# Patient Record
Sex: MALE | Race: BLACK OR AFRICAN AMERICAN | NOT HISPANIC OR LATINO | Employment: OTHER | URBAN - METROPOLITAN AREA
[De-identification: names, ages, dates, MRNs, and addresses within clinical notes are randomized per-mention and may not be internally consistent; named-entity substitution may affect disease eponyms.]

---

## 2020-04-28 ENCOUNTER — HOSPITAL ENCOUNTER (OUTPATIENT)
Dept: ULTRASOUND IMAGING | Age: 70
Discharge: HOME | End: 2020-04-28
Attending: INTERNAL MEDICINE

## 2020-04-28 DIAGNOSIS — M79.604 RIGHT LEG PAIN: ICD-10-CM

## 2020-04-28 PROCEDURE — 93971 EXTREMITY STUDY: CPT

## 2020-04-28 PROCEDURE — 93926 LOWER EXTREMITY STUDY: CPT

## 2020-06-18 ENCOUNTER — OFFICE VISIT (OUTPATIENT)
Dept: WOUND CARE | Facility: HOSPITAL | Age: 70
End: 2020-06-18
Attending: FAMILY MEDICINE
Payer: MEDICARE

## 2020-06-18 DIAGNOSIS — L97.512 NON-PRESSURE CHRONIC ULCER OF OTHER PART OF RIGHT FOOT WITH FAT LAYER EXPOSED (HCC): ICD-10-CM

## 2020-06-18 DIAGNOSIS — E08.621 DIABETIC ULCER OF MIDFOOT ASSOCIATED WITH DIABETES MELLITUS DUE TO UNDERLYING CONDITION, WITH FAT LAYER EXPOSED, UNSPECIFIED LATERALITY (HCC): Primary | ICD-10-CM

## 2020-06-18 DIAGNOSIS — L97.402 DIABETIC ULCER OF MIDFOOT ASSOCIATED WITH DIABETES MELLITUS DUE TO UNDERLYING CONDITION, WITH FAT LAYER EXPOSED, UNSPECIFIED LATERALITY (HCC): Primary | ICD-10-CM

## 2020-06-18 PROCEDURE — 99215 OFFICE O/P EST HI 40 MIN: CPT

## 2020-06-18 PROCEDURE — 82962 GLUCOSE BLOOD TEST: CPT

## 2020-06-18 NOTE — PROGRESS NOTES
Print   x Close    Header Image    Progress Note Details  Patient Name: Allie Montes.   Patient Number: [id number]  Patient YOB: 1950  Date: 6/18/2020  Physician / Xenia Class: Lester Hood: Handy Owusu    Chief Complaint  T history of:  Diabetes  Hypertension  Hyperlipidemia  Blindness    Surgical History  This information was obtained from the patient  Patient has a surgical history of:  Colon resection  Eye surgeries    Review of Systems (ROS)  This information was obtained Maceration. The temperature of the periwound skin is WNL. Periwound skin does not exhibit signs or symptoms of infection. Local Pulse is Weak. Wound #2 Medial Third Toe is an acute Bruce Grade 1 Diabetic Ulcer and has received a status of Not Healed.  I unlabored. Temperature wnl. Weight normal for height. . Appearance neat and clean. Appears in no acute distress. Well nourished and well developed. Respiratory:  Respiratory effort is easy and symmetric bilaterally.  Rate is normal at rest and on room air cyanosis, clubbing or edema. Functional ROM. Gait and station stable. Digits and nails without clubbing, cyanosis, infection, petechiae, ischemia, or inflammatory conditions. Physical Exam Notes  doppler pulses biphasic DP/PT, difficult to palpate.  Mild layer exposed  H54.8: Legal blindness, as defined in [place]: Essential (primary) hypertension        Plan  Pt does have partial tendon exposure, I would like to get eval and opinion from podiatry Dr. Genevieve Cast as well.    Also told patient to find a new endoc

## 2020-06-19 PROCEDURE — A6413 ADHESIVE BANDAGE, FIRST-AID: HCPCS

## 2020-06-19 PROCEDURE — 80000099 HH WOUND CARE SUPPLIES

## 2020-06-19 PROCEDURE — A4649 SURGICAL SUPPLIES: HCPCS

## 2020-06-22 ENCOUNTER — OFFICE VISIT (OUTPATIENT)
Dept: WOUND CARE | Facility: HOSPITAL | Age: 70
End: 2020-06-22
Attending: FAMILY MEDICINE
Payer: MEDICARE

## 2020-06-22 ENCOUNTER — HOSPITAL ENCOUNTER (INPATIENT)
Facility: HOSPITAL | Age: 70
LOS: 8 days | Discharge: HOME HEALTH | DRG: 623 | End: 2020-06-30
Attending: HOSPITALIST | Admitting: HOSPITALIST
Payer: MEDICARE

## 2020-06-22 DIAGNOSIS — L97.512 FOOT ULCER WITH FAT LAYER EXPOSED, RIGHT (HCC): Primary | ICD-10-CM

## 2020-06-22 DIAGNOSIS — E08.621 DIABETIC ULCER OF MIDFOOT ASSOCIATED WITH DIABETES MELLITUS DUE TO UNDERLYING CONDITION, WITH FAT LAYER EXPOSED, UNSPECIFIED LATERALITY (HCC): Primary | ICD-10-CM

## 2020-06-22 DIAGNOSIS — E11.65 UNCONTROLLED TYPE 2 DIABETES MELLITUS WITH HYPERGLYCEMIA (HCC): Chronic | ICD-10-CM

## 2020-06-22 DIAGNOSIS — L97.402 DIABETIC ULCER OF MIDFOOT ASSOCIATED WITH DIABETES MELLITUS DUE TO UNDERLYING CONDITION, WITH FAT LAYER EXPOSED, UNSPECIFIED LATERALITY (HCC): Primary | ICD-10-CM

## 2020-06-22 DIAGNOSIS — L97.512 NON-PRESSURE CHRONIC ULCER OF OTHER PART OF RIGHT FOOT WITH FAT LAYER EXPOSED (HCC): ICD-10-CM

## 2020-06-22 LAB
ANION GAP SERPL CALC-SCNC: 5 MMOL/L (ref 0–18)
BUN BLD-MCNC: 29 MG/DL (ref 7–18)
BUN/CREAT SERPL: 26.6 (ref 10–20)
CALCIUM BLD-MCNC: 9 MG/DL (ref 8.5–10.1)
CHLORIDE SERPL-SCNC: 107 MMOL/L (ref 98–112)
CO2 SERPL-SCNC: 27 MMOL/L (ref 21–32)
CREAT BLD-MCNC: 1.09 MG/DL (ref 0.7–1.3)
EST. AVERAGE GLUCOSE BLD GHB EST-MCNC: 183 MG/DL (ref 68–126)
GLUCOSE BLD-MCNC: 102 MG/DL (ref 70–99)
GLUCOSE BLD-MCNC: 70 MG/DL (ref 70–99)
GLUCOSE BLD-MCNC: 94 MG/DL (ref 70–99)
HBA1C MFR BLD HPLC: 8 % (ref ?–5.7)
OSMOLALITY SERPL CALC.SUM OF ELEC: 294 MOSM/KG (ref 275–295)
POTASSIUM SERPL-SCNC: 3.7 MMOL/L (ref 3.5–5.1)
SARS-COV-2 RNA RESP QL NAA+PROBE: NOT DETECTED
SODIUM SERPL-SCNC: 139 MMOL/L (ref 136–145)

## 2020-06-22 PROCEDURE — 83036 HEMOGLOBIN GLYCOSYLATED A1C: CPT | Performed by: INTERNAL MEDICINE

## 2020-06-22 PROCEDURE — 82962 GLUCOSE BLOOD TEST: CPT

## 2020-06-22 PROCEDURE — 99214 OFFICE O/P EST MOD 30 MIN: CPT

## 2020-06-22 PROCEDURE — 80048 BASIC METABOLIC PNL TOTAL CA: CPT | Performed by: INTERNAL MEDICINE

## 2020-06-22 RX ORDER — LISINOPRIL 20 MG/1
20 TABLET ORAL DAILY
Status: DISCONTINUED | OUTPATIENT
Start: 2020-06-23 | End: 2020-06-30

## 2020-06-22 RX ORDER — DEXTROSE MONOHYDRATE 25 G/50ML
50 INJECTION, SOLUTION INTRAVENOUS
Status: DISCONTINUED | OUTPATIENT
Start: 2020-06-22 | End: 2020-06-30

## 2020-06-22 RX ORDER — SIMVASTATIN 40 MG
40 TABLET ORAL NIGHTLY
COMMUNITY
Start: 2020-06-11

## 2020-06-22 RX ORDER — ATORVASTATIN CALCIUM 20 MG/1
20 TABLET, FILM COATED ORAL NIGHTLY
Status: DISCONTINUED | OUTPATIENT
Start: 2020-06-22 | End: 2020-06-30

## 2020-06-22 RX ORDER — GLIMEPIRIDE 4 MG/1
4 TABLET ORAL DAILY
COMMUNITY
Start: 2020-06-11

## 2020-06-22 RX ORDER — INSULIN GLARGINE 100 [IU]/ML
15 INJECTION, SOLUTION SUBCUTANEOUS NIGHTLY
COMMUNITY
Start: 2020-06-11

## 2020-06-22 RX ORDER — SIMVASTATIN 40 MG
40 TABLET ORAL NIGHTLY
Status: ON HOLD | COMMUNITY
Start: 2020-06-14 | End: 2020-06-22

## 2020-06-22 RX ORDER — HYDRALAZINE HYDROCHLORIDE 25 MG/1
25 TABLET, FILM COATED ORAL DAILY
COMMUNITY
Start: 2020-06-11

## 2020-06-22 RX ORDER — AMLODIPINE BESYLATE 10 MG/1
10 TABLET ORAL DAILY
COMMUNITY
Start: 2020-06-11

## 2020-06-22 RX ORDER — GLIMEPIRIDE 2 MG/1
4 TABLET ORAL DAILY
Status: DISCONTINUED | OUTPATIENT
Start: 2020-06-23 | End: 2020-06-22

## 2020-06-22 RX ORDER — FAMOTIDINE 20 MG/1
40 TABLET ORAL NIGHTLY
Status: DISCONTINUED | OUTPATIENT
Start: 2020-06-22 | End: 2020-06-30

## 2020-06-22 RX ORDER — FAMOTIDINE 40 MG/1
40 TABLET, FILM COATED ORAL NIGHTLY
COMMUNITY
Start: 2020-06-11

## 2020-06-22 RX ORDER — AMLODIPINE BESYLATE 10 MG/1
10 TABLET ORAL DAILY
Status: DISCONTINUED | OUTPATIENT
Start: 2020-06-23 | End: 2020-06-30

## 2020-06-22 RX ORDER — ASPIRIN 81 MG/1
81 TABLET ORAL DAILY
Status: DISCONTINUED | OUTPATIENT
Start: 2020-06-23 | End: 2020-06-30

## 2020-06-22 RX ORDER — HYDRALAZINE HYDROCHLORIDE 25 MG/1
25 TABLET, FILM COATED ORAL 3 TIMES DAILY
Status: DISCONTINUED | OUTPATIENT
Start: 2020-06-22 | End: 2020-06-30

## 2020-06-22 RX ORDER — FOSINOPRIL SODIUM 20 MG/1
20 TABLET ORAL DAILY
COMMUNITY
Start: 2020-04-20

## 2020-06-22 RX ORDER — INSULIN LISPRO 100 [IU]/ML
3 INJECTION, SOLUTION INTRAVENOUS; SUBCUTANEOUS
COMMUNITY
Start: 2020-06-14

## 2020-06-22 NOTE — PLAN OF CARE
Assumed patient care [id number]. Patient alert and oriented x4. Bed in low position. Call light within reach. Vascular surgery, Podiatry and ID consulted.     Problem: Diabetes/Glucose Control  Goal: Glucose maintained within prescribed range  Description  INTERV

## 2020-06-22 NOTE — PROGRESS NOTES
Progress Note Details  Patient Name: Sumi Prasad.   Patient Number: [id number]  Patient YOB: 1950  Date: 6/22/2020  Physician / Annalee Acosta: Kailyn Valdez 19: Teresa 44    Chief Complaint  This information was obtained from the pa measurements are 4.4cm length x 4cm width x 1cm depth, with an area of 17.6 sq cm and a volume of 17.6 cubic cm. Necrotic tendon is exposed. No tunneling has been noted. No sinus tract has been noted. No undermining has been noted.  There is a moderate amou Weak.    Wound #4 Right, Medial Second Toe is an acute Bruce Grade 1 Diabetic Ulcer and has received a status of Not Healed.  Initial wound encounter measurements are 0.4cm length x 0.2cm width x 0.1cm depth, with an area of 0.08 sq cm and a volume of 0.00 Posterior Tibial Pulse: Biphasic  Right Dorsalis Pedis Pulse: Biphasic        Assessment    Active Problems    ICD-10  (Encounter Diagnosis) E11.621 - Type 2 diabetes mellitus with foot ulcer  (Encounter Diagnosis) L97.513 - Non-pressure chronic ulcer of o [facility] [place] [facility] for further management    Care summary  Discussed the Plan of Care at bedside with patient. The patient verbally acknowledges understanding of all instructions and all questions were answered. Reviewed and evaluated labs.   Perfusion assessed by ult

## 2020-06-23 ENCOUNTER — APPOINTMENT (OUTPATIENT)
Dept: INTERVENTIONAL RADIOLOGY/VASCULAR | Facility: HOSPITAL | Age: 70
DRG: 623 | End: 2020-06-23
Attending: SURGERY
Payer: MEDICARE

## 2020-06-23 ENCOUNTER — ANESTHESIA EVENT (OUTPATIENT)
Dept: SURGERY | Facility: HOSPITAL | Age: 70
DRG: 623 | End: 2020-06-23
Payer: MEDICARE

## 2020-06-23 ENCOUNTER — APPOINTMENT (OUTPATIENT)
Dept: MRI IMAGING | Facility: HOSPITAL | Age: 70
DRG: 623 | End: 2020-06-23
Attending: PODIATRIST
Payer: MEDICARE

## 2020-06-23 ENCOUNTER — APPOINTMENT (OUTPATIENT)
Dept: ULTRASOUND IMAGING | Facility: HOSPITAL | Age: 70
DRG: 623 | End: 2020-06-23
Attending: PODIATRIST
Payer: MEDICARE

## 2020-06-23 PROBLEM — L97.512 FOOT ULCER WITH FAT LAYER EXPOSED, RIGHT (HCC): Status: ACTIVE | Noted: 2020-06-23

## 2020-06-23 LAB
ALBUMIN SERPL-MCNC: 3.2 G/DL (ref 3.4–5)
ALBUMIN/GLOB SERPL: 0.8 {RATIO} (ref 1–2)
ALP LIVER SERPL-CCNC: 90 U/L (ref 45–117)
ALT SERPL-CCNC: 21 U/L (ref 16–61)
ANION GAP SERPL CALC-SCNC: 3 MMOL/L (ref 0–18)
AST SERPL-CCNC: 23 U/L (ref 15–37)
BASOPHILS # BLD AUTO: 0.04 X10(3) UL (ref 0–0.2)
BASOPHILS NFR BLD AUTO: 0.9 %
BILIRUB SERPL-MCNC: 0.7 MG/DL (ref 0.1–2)
BUN BLD-MCNC: 23 MG/DL (ref 7–18)
BUN/CREAT SERPL: 20.2 (ref 10–20)
CALCIUM BLD-MCNC: 8.9 MG/DL (ref 8.5–10.1)
CHLORIDE SERPL-SCNC: 109 MMOL/L (ref 98–112)
CO2 SERPL-SCNC: 29 MMOL/L (ref 21–32)
CREAT BLD-MCNC: 1.14 MG/DL (ref 0.7–1.3)
DEPRECATED RDW RBC AUTO: 45.1 FL (ref 35.1–46.3)
EOSINOPHIL # BLD AUTO: 0.07 X10(3) UL (ref 0–0.7)
EOSINOPHIL NFR BLD AUTO: 1.5 %
ERYTHROCYTE [DISTWIDTH] IN BLOOD BY AUTOMATED COUNT: 13.8 % (ref 11–15)
GLOBULIN PLAS-MCNC: 3.8 G/DL (ref 2.8–4.4)
GLUCOSE BLD-MCNC: 114 MG/DL (ref 70–99)
GLUCOSE BLD-MCNC: 134 MG/DL (ref 70–99)
GLUCOSE BLD-MCNC: 172 MG/DL (ref 70–99)
GLUCOSE BLD-MCNC: 210 MG/DL (ref 70–99)
GLUCOSE BLD-MCNC: 53 MG/DL (ref 70–99)
GLUCOSE BLD-MCNC: 77 MG/DL (ref 70–99)
GLUCOSE BLD-MCNC: 96 MG/DL (ref 70–99)
GLUCOSE BLD-MCNC: 98 MG/DL (ref 70–99)
HCT VFR BLD AUTO: 37.1 % (ref 39–53)
HGB BLD-MCNC: 12.3 G/DL (ref 13–17.5)
IMM GRANULOCYTES # BLD AUTO: 0.01 X10(3) UL (ref 0–1)
IMM GRANULOCYTES NFR BLD: 0.2 %
LYMPHOCYTES # BLD AUTO: 1.58 X10(3) UL (ref 1–4)
LYMPHOCYTES NFR BLD AUTO: 34.3 %
M PROTEIN MFR SERPL ELPH: 7 G/DL (ref 6.4–8.2)
MCH RBC QN AUTO: 29.6 PG (ref 26–34)
MCHC RBC AUTO-ENTMCNC: 33.2 G/DL (ref 31–37)
MCV RBC AUTO: 89.2 FL (ref 80–100)
MONOCYTES # BLD AUTO: 0.53 X10(3) UL (ref 0.1–1)
MONOCYTES NFR BLD AUTO: 11.5 %
NEUTROPHILS # BLD AUTO: 2.38 X10 (3) UL (ref 1.5–7.7)
NEUTROPHILS # BLD AUTO: 2.38 X10(3) UL (ref 1.5–7.7)
NEUTROPHILS NFR BLD AUTO: 51.6 %
OSMOLALITY SERPL CALC.SUM OF ELEC: 296 MOSM/KG (ref 275–295)
PLATELET # BLD AUTO: 286 10(3)UL (ref 150–450)
POTASSIUM SERPL-SCNC: 3.5 MMOL/L (ref 3.5–5.1)
RBC # BLD AUTO: 4.16 X10(6)UL (ref 3.8–5.8)
SODIUM SERPL-SCNC: 141 MMOL/L (ref 136–145)
WBC # BLD AUTO: 4.6 X10(3) UL (ref 4–11)

## 2020-06-23 PROCEDURE — 99153 MOD SED SAME PHYS/QHP EA: CPT

## 2020-06-23 PROCEDURE — 37232 HC TRANSL ANGIOPLASTY TIBIAL PERONEAL UNIL EA ADDL: CPT

## 2020-06-23 PROCEDURE — 047R3ZZ DILATION OF RIGHT POSTERIOR TIBIAL ARTERY, PERCUTANEOUS APPROACH: ICD-10-PCS | Performed by: SURGERY

## 2020-06-23 PROCEDURE — 93922 UPR/L XTREMITY ART 2 LEVELS: CPT | Performed by: PODIATRIST

## 2020-06-23 PROCEDURE — 047T3ZZ DILATION OF RIGHT PERONEAL ARTERY, PERCUTANEOUS APPROACH: ICD-10-PCS | Performed by: SURGERY

## 2020-06-23 PROCEDURE — 80053 COMPREHEN METABOLIC PANEL: CPT | Performed by: INTERNAL MEDICINE

## 2020-06-23 PROCEDURE — 76937 US GUIDE VASCULAR ACCESS: CPT

## 2020-06-23 PROCEDURE — 047P3ZZ DILATION OF RIGHT ANTERIOR TIBIAL ARTERY, PERCUTANEOUS APPROACH: ICD-10-PCS | Performed by: SURGERY

## 2020-06-23 PROCEDURE — 75710 ARTERY X-RAYS ARM/LEG: CPT

## 2020-06-23 PROCEDURE — 85025 COMPLETE CBC W/AUTO DIFF WBC: CPT | Performed by: INTERNAL MEDICINE

## 2020-06-23 PROCEDURE — 73718 MRI LOWER EXTREMITY W/O DYE: CPT | Performed by: PODIATRIST

## 2020-06-23 PROCEDURE — 99152 MOD SED SAME PHYS/QHP 5/>YRS: CPT

## 2020-06-23 PROCEDURE — 04CP3ZZ EXTIRPATION OF MATTER FROM RIGHT ANTERIOR TIBIAL ARTERY, PERCUTANEOUS APPROACH: ICD-10-PCS | Performed by: SURGERY

## 2020-06-23 PROCEDURE — 82962 GLUCOSE BLOOD TEST: CPT

## 2020-06-23 PROCEDURE — 37229 HC TRANSL ANGIO W ATHERECT TIBIAL PERONEAL UNILAT INIT: CPT

## 2020-06-23 PROCEDURE — 37228 HC TRANSLUMINAL ANGIO TIBIAL PERONEAL UNILAT INIT: CPT

## 2020-06-23 PROCEDURE — B41F1ZZ FLUOROSCOPY OF RIGHT LOWER EXTREMITY ARTERIES USING LOW OSMOLAR CONTRAST: ICD-10-PCS | Performed by: SURGERY

## 2020-06-23 RX ORDER — MIDAZOLAM HYDROCHLORIDE 1 MG/ML
INJECTION INTRAMUSCULAR; INTRAVENOUS
Status: COMPLETED
Start: 2020-06-23 | End: 2020-06-23

## 2020-06-23 RX ORDER — CLOPIDOGREL BISULFATE 75 MG/1
TABLET ORAL
Status: COMPLETED
Start: 2020-06-23 | End: 2020-06-23

## 2020-06-23 RX ORDER — CLOPIDOGREL BISULFATE 75 MG/1
75 TABLET ORAL DAILY
Status: DISCONTINUED | OUTPATIENT
Start: 2020-06-24 | End: 2020-06-30

## 2020-06-23 RX ORDER — HEPARIN SODIUM 5000 [USP'U]/ML
INJECTION, SOLUTION INTRAVENOUS; SUBCUTANEOUS
Status: COMPLETED
Start: 2020-06-23 | End: 2020-06-23

## 2020-06-23 RX ORDER — LIDOCAINE HYDROCHLORIDE 10 MG/ML
INJECTION, SOLUTION EPIDURAL; INFILTRATION; INTRACAUDAL; PERINEURAL
Status: COMPLETED
Start: 2020-06-23 | End: 2020-06-23

## 2020-06-23 NOTE — BRIEF OP NOTE
Pre-Operative Diagnosis: Atherosclerosis with ulcer right foot     Post-Operative Diagnosis: same      Procedure Performed:   1. US perc access left common femoral artery  2. Selection of right common femoral artery and angiogram right leg  3.  Balloon clarice

## 2020-06-23 NOTE — PHYSICAL THERAPY NOTE
Order received for PT eval per admission protocol. Pt with ongoing medical workup of R foot ulcer. Plan for angiocath this am. Will continue to follow.

## 2020-06-23 NOTE — ANESTHESIA PREPROCEDURE EVALUATION
PRE-OP EVALUATION    Patient Name: Randy Grey    Pre-op Diagnosis: INPT    Procedure(s):  RIGHT FOOT WOUND DEBRIDEMENT WITH APPLICATION OF INTEGRA GRAFT AND WOUND VAC    Surgeon(s) and Role:     * Ahmed, Cherre Seip, DPM - Primary    Pre-op vitals reviewed hydrALAzine HCl 25 MG Oral Tab, Take 25 mg by mouth 3 (three) times daily. , Disp: , Rfl:   glimepiride 4 MG Oral Tab, Take 4 mg by mouth daily. , Disp: , Rfl:   BASAGLAR KWIKPEN 100 UNIT/ML Subcutaneous Solution Pen-injector, Inject 20 Units as directed nig Pulmonary exam normal.                 Other findings            ASA: 3   Plan: general       Post-procedure pain management plan discussed with surgeon and patient.     Comment: I explained intrinsic risks of general anesthesia, including nausea, dental

## 2020-06-23 NOTE — CONSULTS
Tina Current [place]  8/1/1950  [id number]      PODIATRY, FOOT AND SURGERY CONSULTATION NOTE    REASON FOR CONSULTATION: Open wound right foot    CONSULTING PHYSICIAN: Dr. Keith Brennan    HPI:  Wes Bellamy is a a(n) 78year old male who presents for admission fro Non-medical: Not on file    Tobacco Use      Smoking status: Never Smoker      Smokeless tobacco: Never Used    Substance and Sexual Activity      Alcohol use: Never        Frequency: Never      Drug use: Never      Sexual activity: Not on file    Lifestyl Glucose 105 (H) 70 - 99 mg/dL   POCT GLUCOSE    Collection Time: 06/22/20  4:42 PM   Result Value Ref Range    POC Glucose 70 70 - 99 mg/dL   RAPID SARS-COV-2 BY PCR    Collection Time: 06/22/20  5:17 PM   Result Value Ref Range    Rapid SARS-CoV-2 by PCR 2.8 - 4.4 g/dL    A/G Ratio 0.8 (L) 1.0 - 2.0   CBC W/ DIFFERENTIAL    Collection Time: 06/23/20  7:08 AM   Result Value Ref Range    WBC 4.6 4.0 - 11.0 x10(3) uL    RBC 4.16 3.80 - 5.80 x10(6)uL    HGB 12.3 (L) 13.0 - 17.5 g/dL    HCT 37.1 (L) 39.0 - 53. 0

## 2020-06-23 NOTE — H&P
[address] [facility] Patient Status:  Inpatient    1950 MRN [id number]   [facility] 3NE-A Attending Prema Ross MD   Hosp Day # 1 PCP Marjorie Cano MD     History of Present Illness: review of systems was negative. Blood pressure 123/81, pulse 70, temperature 98.4 °F (36.9 °C), temperature source Oral, resp. rate 12, height 5' 10\" (1.778 m), weight 172 lb 3.2 oz (78.1 kg), SpO2 97 %.     Physical Exam:  General Appearance:    Alert, c ALB  --  3.2*    141   K 3.7 3.5    109   CO2 27.0 29.0   ALKPHO  --  90   AST  --  23   ALT  --  21   BILT  --  0.7   TP  --  7.0       Recent Labs   Lab 06/22/20  1434 06/22/20  1453 06/22/20  1642 06/22/20  1902 06/23/20  0702 06/23/20  12

## 2020-06-23 NOTE — PLAN OF CARE
Assumed care at 0730 A/Ox4, legally blind. Blind in right eye w/some in left. R/A, lungs clear, no tele, Void in urinal. BM yesterday. Denies pain. Up/ standby and cane. Bunny boots for heel offload bilat. Elec prot, k replaced.  See separate note for hypog

## 2020-06-23 NOTE — CONSULTS
[facility] & [address] [place] Patient Status:  Inpatient    1950 MRN [id number]   [facility] 3NE-A Attending Lia Tang MD   Hosp Day # 1 PCP Bia Crane MD Daily  •  hydrALAzine HCl (APRESOLINE) tab 25 mg, 25 mg, Oral, TID  •  metoprolol Tartrate (LOPRESSOR) tab 25 mg, 25 mg, Oral, Daily  •  atorvastatin (LIPITOR) tab 20 mg, 20 mg, Oral, Nightly  •  insulin detemir (LEVEMIR) 100 UNIT/ML flextouch 20 Units, 20 material      Laboratory Data:  Laboratory data reviewed      Recent Labs   Lab 06/23/20  0708   RBC 4.16   HGB 12.3*   HCT 37.1*   MCV 89.2   MCH 29.6   MCHC 33.2   RDW 13.8   NEPRELIM 2.38   WBC 4.6   .0       Recent Labs   Lab 06/22/20 1941 06/2

## 2020-06-23 NOTE — PLAN OF CARE
Received patient A/Ox4 drowsy, RA, NSR on tele at [address]  Patient has left groin incision from recent angio, femstop in place, groin site checked 1840, 1855, 1910, and 1925, no hematoma, site soft, pulse noted, normal sensation   Patient has wound to right f

## 2020-06-23 NOTE — PLAN OF CARE
Received patient awake in bed. AOx4. Legally blind to right eye. On room air, clear lungs. Voids per uirnal. Denies any pain. Ambulates with a walker. QID accuchecks. MRI of the right foot done= reactive osteitis vs early OM. On IV Unasyn.    NPO post MN f

## 2020-06-23 NOTE — CONSULTS
[facility]  Vascular Surgery Consultation    Tanya Grey Patient Status:  Inpatient    1950 MRN [id number]   [facility] 3NE-A Attending Jackie Doherty MD   Hosp Day # 0 PCP Savannah Steel MD     Reason for Consultation:  Ulcer o •  Ampicillin-Sulbactam Sodium (UNASYN) 3 g in sodium chloride 0.9% 100 mL MBP/ADD-vantage, 3 g, Intravenous, Q8H  •  glucose (DEX4) oral liquid 15 g, 15 g, Oral, Q15 Min PRN **OR** Glucose-Vitamin C (DEX-4) chewable tab 4 tablet, 4 tablet, Oral, Q15 Min P RIGHT 3 doppler doppler doppler         LEFT 3 doppler doppler doppler               On the dorsum of the foot there is a 3 x 3 cm wound with desiccated and dry that goes down to tendon and bone. I cannot appreciate an abscess.   There is mild amount of ed

## 2020-06-23 NOTE — PROGRESS NOTES
PCT reported pt bloodsugar of 53, pt arousable and alert. 50mgIV dextrose given d/t pt npo status. 20 minute recheck of pt sugar was 172. Dr. Christopher Merchant notified at bedside. Will continue to monitor.

## 2020-06-23 NOTE — PROGRESS NOTES
Updated sister Bassam Kelly about findings of procedure    Dr. Holden Fass to proceed tomorrow    Due to the depth of the wound and damage to the architecture of the foot and his comormidities - his wound may not heal. I emphasized this to her and that we are doing what

## 2020-06-24 ENCOUNTER — ANESTHESIA (OUTPATIENT)
Dept: SURGERY | Facility: HOSPITAL | Age: 70
DRG: 623 | End: 2020-06-24
Payer: MEDICARE

## 2020-06-24 PROBLEM — E78.2 HYPERCHOLESTEROLEMIA WITH HYPERTRIGLYCERIDEMIA: Chronic | Status: ACTIVE | Noted: 2020-06-24

## 2020-06-24 PROBLEM — I73.9 PAD (PERIPHERAL ARTERY DISEASE) (HCC): Chronic | Status: ACTIVE | Noted: 2020-06-24

## 2020-06-24 PROBLEM — H54.8 BLINDNESS, LEGAL: Chronic | Status: ACTIVE | Noted: 2020-06-24

## 2020-06-24 PROBLEM — E11.65 UNCONTROLLED TYPE 2 DIABETES MELLITUS WITH HYPERGLYCEMIA (HCC): Chronic | Status: ACTIVE | Noted: 2020-06-24

## 2020-06-24 PROBLEM — I10 BENIGN HYPERTENSION: Chronic | Status: ACTIVE | Noted: 2020-06-24

## 2020-06-24 PROBLEM — I73.9 PAD (PERIPHERAL ARTERY DISEASE): Chronic | Status: ACTIVE | Noted: 2020-06-24

## 2020-06-24 LAB
ALBUMIN SERPL-MCNC: 3.2 G/DL (ref 3.4–5)
ALBUMIN/GLOB SERPL: 0.9 {RATIO} (ref 1–2)
ALP LIVER SERPL-CCNC: 93 U/L (ref 45–117)
ALT SERPL-CCNC: 20 U/L (ref 16–61)
ANION GAP SERPL CALC-SCNC: 7 MMOL/L (ref 0–18)
AST SERPL-CCNC: 19 U/L (ref 15–37)
BASOPHILS # BLD AUTO: 0.01 X10(3) UL (ref 0–0.2)
BASOPHILS NFR BLD AUTO: 0.1 %
BILIRUB SERPL-MCNC: 0.6 MG/DL (ref 0.1–2)
BUN BLD-MCNC: 14 MG/DL (ref 7–18)
BUN/CREAT SERPL: 13.1 (ref 10–20)
CALCIUM BLD-MCNC: 8.4 MG/DL (ref 8.5–10.1)
CHLORIDE SERPL-SCNC: 109 MMOL/L (ref 98–112)
CO2 SERPL-SCNC: 24 MMOL/L (ref 21–32)
CREAT BLD-MCNC: 1.07 MG/DL (ref 0.7–1.3)
DEPRECATED RDW RBC AUTO: 45.4 FL (ref 35.1–46.3)
EOSINOPHIL # BLD AUTO: 0 X10(3) UL (ref 0–0.7)
EOSINOPHIL NFR BLD AUTO: 0 %
ERYTHROCYTE [DISTWIDTH] IN BLOOD BY AUTOMATED COUNT: 14.1 % (ref 11–15)
GLOBULIN PLAS-MCNC: 3.6 G/DL (ref 2.8–4.4)
GLUCOSE BLD-MCNC: 131 MG/DL (ref 70–99)
GLUCOSE BLD-MCNC: 181 MG/DL (ref 70–99)
GLUCOSE BLD-MCNC: 194 MG/DL (ref 70–99)
GLUCOSE BLD-MCNC: 197 MG/DL (ref 70–99)
GLUCOSE BLD-MCNC: 84 MG/DL (ref 70–99)
GLUCOSE BLD-MCNC: 95 MG/DL (ref 70–99)
HCT VFR BLD AUTO: 37.2 % (ref 39–53)
HGB BLD-MCNC: 12.2 G/DL (ref 13–17.5)
IMM GRANULOCYTES # BLD AUTO: 0.03 X10(3) UL (ref 0–1)
IMM GRANULOCYTES NFR BLD: 0.4 %
LYMPHOCYTES # BLD AUTO: 0.68 X10(3) UL (ref 1–4)
LYMPHOCYTES NFR BLD AUTO: 9.5 %
M PROTEIN MFR SERPL ELPH: 6.8 G/DL (ref 6.4–8.2)
MCH RBC QN AUTO: 29.3 PG (ref 26–34)
MCHC RBC AUTO-ENTMCNC: 32.8 G/DL (ref 31–37)
MCV RBC AUTO: 89.4 FL (ref 80–100)
MONOCYTES # BLD AUTO: 0.29 X10(3) UL (ref 0.1–1)
MONOCYTES NFR BLD AUTO: 4.1 %
NEUTROPHILS # BLD AUTO: 6.12 X10 (3) UL (ref 1.5–7.7)
NEUTROPHILS # BLD AUTO: 6.12 X10(3) UL (ref 1.5–7.7)
NEUTROPHILS NFR BLD AUTO: 85.9 %
OSMOLALITY SERPL CALC.SUM OF ELEC: 295 MOSM/KG (ref 275–295)
PLATELET # BLD AUTO: 275 10(3)UL (ref 150–450)
POTASSIUM SERPL-SCNC: 3.6 MMOL/L (ref 3.5–5.1)
RBC # BLD AUTO: 4.16 X10(6)UL (ref 3.8–5.8)
SODIUM SERPL-SCNC: 140 MMOL/L (ref 136–145)
WBC # BLD AUTO: 7.1 X10(3) UL (ref 4–11)

## 2020-06-24 PROCEDURE — 80053 COMPREHEN METABOLIC PANEL: CPT | Performed by: SURGERY

## 2020-06-24 PROCEDURE — 87077 CULTURE AEROBIC IDENTIFY: CPT | Performed by: PODIATRIST

## 2020-06-24 PROCEDURE — 0HRMXK3 REPLACEMENT OF RIGHT FOOT SKIN WITH NONAUTOLOGOUS TISSUE SUBSTITUTE, FULL THICKNESS, EXTERNAL APPROACH: ICD-10-PCS | Performed by: PODIATRIST

## 2020-06-24 PROCEDURE — 87186 SC STD MICRODIL/AGAR DIL: CPT | Performed by: PODIATRIST

## 2020-06-24 PROCEDURE — 87205 SMEAR GRAM STAIN: CPT | Performed by: PODIATRIST

## 2020-06-24 PROCEDURE — 87070 CULTURE OTHR SPECIMN AEROBIC: CPT | Performed by: PODIATRIST

## 2020-06-24 PROCEDURE — 82962 GLUCOSE BLOOD TEST: CPT

## 2020-06-24 PROCEDURE — 87075 CULTR BACTERIA EXCEPT BLOOD: CPT | Performed by: PODIATRIST

## 2020-06-24 PROCEDURE — 87176 TISSUE HOMOGENIZATION CULTR: CPT | Performed by: PODIATRIST

## 2020-06-24 PROCEDURE — 0KBV0ZZ EXCISION OF RIGHT FOOT MUSCLE, OPEN APPROACH: ICD-10-PCS | Performed by: PODIATRIST

## 2020-06-24 PROCEDURE — 85025 COMPLETE CBC W/AUTO DIFF WBC: CPT | Performed by: SURGERY

## 2020-06-24 DEVICE — INTEGRA® BILAYER MATRIX WOUND DRESSING 2 IN*2 IN (5 CM*5 CM)
Type: IMPLANTABLE DEVICE | Site: FOOT | Status: FUNCTIONAL
Brand: INTEGRA®

## 2020-06-24 RX ORDER — HYDROCODONE BITARTRATE AND ACETAMINOPHEN 5; 325 MG/1; MG/1
1 TABLET ORAL AS NEEDED
Status: DISCONTINUED | OUTPATIENT
Start: 2020-06-24 | End: 2020-06-24 | Stop reason: HOSPADM

## 2020-06-24 RX ORDER — BUPIVACAINE HYDROCHLORIDE 5 MG/ML
INJECTION, SOLUTION EPIDURAL; INTRACAUDAL AS NEEDED
Status: DISCONTINUED | OUTPATIENT
Start: 2020-06-24 | End: 2020-06-24 | Stop reason: HOSPADM

## 2020-06-24 RX ORDER — SODIUM CHLORIDE 9 MG/ML
INJECTION, SOLUTION INTRAVENOUS CONTINUOUS PRN
Status: DISCONTINUED | OUTPATIENT
Start: 2020-06-24 | End: 2020-06-24 | Stop reason: SURG

## 2020-06-24 RX ORDER — TERBINAFINE HYDROCHLORIDE 250 MG/1
250 TABLET ORAL DAILY
Status: DISCONTINUED | OUTPATIENT
Start: 2020-06-24 | End: 2020-06-30

## 2020-06-24 RX ORDER — DEXTROSE MONOHYDRATE 25 G/50ML
50 INJECTION, SOLUTION INTRAVENOUS
Status: DISCONTINUED | OUTPATIENT
Start: 2020-06-24 | End: 2020-06-24 | Stop reason: HOSPADM

## 2020-06-24 RX ORDER — HYDROMORPHONE HYDROCHLORIDE 1 MG/ML
0.4 INJECTION, SOLUTION INTRAMUSCULAR; INTRAVENOUS; SUBCUTANEOUS EVERY 5 MIN PRN
Status: DISCONTINUED | OUTPATIENT
Start: 2020-06-24 | End: 2020-06-24 | Stop reason: HOSPADM

## 2020-06-24 RX ORDER — INSULIN ASPART 100 [IU]/ML
INJECTION, SOLUTION INTRAVENOUS; SUBCUTANEOUS ONCE
Status: DISCONTINUED | OUTPATIENT
Start: 2020-06-24 | End: 2020-06-24 | Stop reason: HOSPADM

## 2020-06-24 RX ORDER — BACITRACIN 50000 [USP'U]/1
INJECTION, POWDER, LYOPHILIZED, FOR SOLUTION INTRAMUSCULAR AS NEEDED
Status: DISCONTINUED | OUTPATIENT
Start: 2020-06-24 | End: 2020-06-24 | Stop reason: HOSPADM

## 2020-06-24 RX ORDER — CEFAZOLIN SODIUM 1 G/3ML
INJECTION, POWDER, FOR SOLUTION INTRAMUSCULAR; INTRAVENOUS AS NEEDED
Status: DISCONTINUED | OUTPATIENT
Start: 2020-06-24 | End: 2020-06-24 | Stop reason: SURG

## 2020-06-24 RX ORDER — ONDANSETRON 2 MG/ML
4 INJECTION INTRAMUSCULAR; INTRAVENOUS AS NEEDED
Status: DISCONTINUED | OUTPATIENT
Start: 2020-06-24 | End: 2020-06-24 | Stop reason: HOSPADM

## 2020-06-24 RX ORDER — NALOXONE HYDROCHLORIDE 0.4 MG/ML
80 INJECTION, SOLUTION INTRAMUSCULAR; INTRAVENOUS; SUBCUTANEOUS AS NEEDED
Status: DISCONTINUED | OUTPATIENT
Start: 2020-06-24 | End: 2020-06-24 | Stop reason: HOSPADM

## 2020-06-24 RX ORDER — HYDROCODONE BITARTRATE AND ACETAMINOPHEN 5; 325 MG/1; MG/1
2 TABLET ORAL AS NEEDED
Status: DISCONTINUED | OUTPATIENT
Start: 2020-06-24 | End: 2020-06-24 | Stop reason: HOSPADM

## 2020-06-24 RX ORDER — SODIUM CHLORIDE, SODIUM LACTATE, POTASSIUM CHLORIDE, CALCIUM CHLORIDE 600; 310; 30; 20 MG/100ML; MG/100ML; MG/100ML; MG/100ML
INJECTION, SOLUTION INTRAVENOUS CONTINUOUS
Status: DISCONTINUED | OUTPATIENT
Start: 2020-06-24 | End: 2020-06-24 | Stop reason: HOSPADM

## 2020-06-24 RX ADMIN — CEFAZOLIN SODIUM 2 G: 1 INJECTION, POWDER, FOR SOLUTION INTRAMUSCULAR; INTRAVENOUS at 18:08:00

## 2020-06-24 RX ADMIN — SODIUM CHLORIDE: 9 INJECTION, SOLUTION INTRAVENOUS at 17:57:00

## 2020-06-24 RX ADMIN — SODIUM CHLORIDE: 9 INJECTION, SOLUTION INTRAVENOUS at 18:45:00

## 2020-06-24 NOTE — PROGRESS NOTES
No complaints  Left groin soft  Excellent signals in left foot at post tib and peroneal     Proceed with debridement later today

## 2020-06-24 NOTE — PROGRESS NOTES
[address] Progress Note    Randy Nortonin Patient Status:  Inpatient    1950 MRN [id number]   [facility] 7NE-A Attending Jann Little MD   Hosp Day # 2 PCP eNgro Cline MD     Subjective:  [address] i clear, EOM's intact, fundi     benign, both eyes, pt legally blind        Ears:    Normal TM's and external ear canals, both ears   Nose:   Nares normal, septum midline, mucosa normal, no drainage    or sinus tenderness   Throat:   Lips, mucosa, and tongue DFU, hx of abscess s/p I&D, r/o OM  TECHNIQUE:  Multiplanar imaging of the foot is acquired including axial, sagittal and coronal imaging. Proton density, T2 weighted and fat suppression sequences are included.   Exam was performed without intravenous gadol (ohr=[id number])    Result Date: 6/23/2020  PROCEDURE:  US ANKLE TOE BRACHIAL INDEX BILATERAL (CPT=93922)  COMPARISON:  None. INDICATIONS:  DFU  TECHNIQUE:  Doppler waveform of the arterial tree was performed.   Pressure measurements were obtained of the lower tibial  On Plavix, Art Doppler OK though    Onychomycosis  Will start Terbinafine          Diana Rios  6/24/2020  12:01 PM

## 2020-06-24 NOTE — PHYSICAL THERAPY NOTE
PT consult received per ADL Mobility Score. Pt is going for I and D of foot ulcer. Request new PT orders post procedure, including WB/acivity restrictions.

## 2020-06-24 NOTE — PLAN OF CARE
Problem: Diabetes/Glucose Control  Goal: Glucose maintained within prescribed range  Description  INTERVENTIONS:  - Monitor Blood Glucose as ordered  - Assess for signs and symptoms of hyperglycemia and hypoglycemia  - Administer ordered medications to m RN  Outcome: Progressing  6/24/2020 0355 by Ben Sharma RN  Outcome: Progressing     Problem: SKIN/TISSUE INTEGRITY - ADULT  Goal: Skin integrity remains intact  Description  INTERVENTIONS  - Assess and document risk factors for pressure ulcer develo of internal bleeding  - Monitor lab trends  - Patient is to report abnormal signs of bleeding to staff  - Avoid use of toothpicks and dental floss  - Use electric shaver for shaving  - Use soft bristle tooth brush  - Limit straining and forceful nose blowi

## 2020-06-24 NOTE — BRIEF OP NOTE
Pre-Operative Diagnosis: CHRONIC DIABETIC RIGHT FOOT ULCER     Post-Operative Diagnosis: CHRONIC DIABETIC RIGHT FOOT ULCER      Procedure Performed:   Procedure(s):  RIGHT FOOT WOUND DEBRIDEMENT WITH APPLICATION OF INTEGRA GRAFT AND WOUND VAC    Surgeon(s)

## 2020-06-24 NOTE — PROGRESS NOTES
[facility]                INFECTIOUS DISEASE PROGRESS NOTE    Rachel Nortonin Patient Status:  Inpatient    1950 MRN [id number]   [facility] 7NE-A Attending Vaishali Stout MD   Hosp Day # 2 PCP MD Dave Mueller

## 2020-06-24 NOTE — OPERATIVE REPORT
Date of surgery: 6/24/2020     Preoperative Diagnosis:   1. Chronic nonhealing ischemic right foot diabetic ulcer  2. Diabetic neuropathy  3. PAD    Postoperative Diagnosis: Same     Procedure:   1.   Excisional wound debridement to level of muscle and t

## 2020-06-24 NOTE — PLAN OF CARE
Assumed care at 0700. Pt A/O x4. Legally blind in the R eye and L eye is blurred. RA. NSR on tele. VSS. Pt denies any pain. R foot drsg is clean/dry/intact. Bilateral bunny boots in place. BM x1. Plan for debridement of R foot tonight.  On the schedule for

## 2020-06-24 NOTE — ANESTHESIA POSTPROCEDURE EVALUATION
[address] [facility] [address] [place] Patient Status:  Inpatient   Age/Gender 78year old male MRN [id number]   Location [address] [facility] Attending Danielle Winters, [address] [facility] Se Day # 2 PCP Donato Danielson MD       Anesthesia Post-op Note    Procedure(s):  RIG

## 2020-06-24 NOTE — PLAN OF CARE
Pt s/p angio. Fem stop removed from lt groin around 2230. Site remains soft w/ no hematoma. Gauze and tegaderm in place w/ small blood drainage noted. Pedal pulses palpable. Pt denies any pain. Remains flat in bed for the night.  Rt food w/ dry gauze drsg in

## 2020-06-24 NOTE — PROCEDURES
[facility]    PATIENT'S NAME: Marie Perdue   ATTENDING PHYSICIAN: Addie Terrazas M.D. OPERATING PHYSICIAN: Akshat Raman M.D.    PATIENT ACCOUNT#:   [de-identified]    LOCATION:  [address] [place] [address]  MEDICAL RECORD #:   [id number]       DATE OF BIRTH: then went to a nursing home and wound care may have not been adequate as the wound that he presented with to the [facility] was desiccated, dry with exposed tendon and bone as depicted in a photo I took in my consult note.   He is at high risk for was systemically heparinized. I then used a Gladius wire and selected the anterior tibial artery, and the wire easily traversed to the level of the ankle.   We then selected a 3 mm balloon and balloon angioplastied the peroneal for an extended inflation wi the foot. We then ballooned this. The anterior tibial artery was significantly smaller than the peroneal and the posterior tibial arteries.   I selected a 2.5 mm balloon and balloon angioplastied the anterior tibial artery in the midsegment with an excell

## 2020-06-24 NOTE — CM/SW NOTE
06/24/20 1500   CM/SW Referral Data   Referral Source Physician   Reason for Referral Discharge planning   Informant Patient; Other   Patient Info   Patient's Mental Status Alert   Patient Communication Issues Visual impairment   Patient's Home Environme

## 2020-06-25 ENCOUNTER — APPOINTMENT (OUTPATIENT)
Dept: WOUND CARE | Age: 70
End: 2020-06-25
Attending: HOSPITALIST

## 2020-06-25 LAB
GLUCOSE BLD-MCNC: 109 MG/DL (ref 70–99)
GLUCOSE BLD-MCNC: 144 MG/DL (ref 70–99)
GLUCOSE BLD-MCNC: 202 MG/DL (ref 70–99)
GLUCOSE BLD-MCNC: 226 MG/DL (ref 70–99)
POTASSIUM SERPL-SCNC: 3.4 MMOL/L (ref 3.5–5.1)
POTASSIUM SERPL-SCNC: 4 MMOL/L (ref 3.5–5.1)

## 2020-06-25 PROCEDURE — 97530 THERAPEUTIC ACTIVITIES: CPT

## 2020-06-25 PROCEDURE — 84132 ASSAY OF SERUM POTASSIUM: CPT | Performed by: INTERNAL MEDICINE

## 2020-06-25 PROCEDURE — 97162 PT EVAL MOD COMPLEX 30 MIN: CPT

## 2020-06-25 PROCEDURE — 82962 GLUCOSE BLOOD TEST: CPT

## 2020-06-25 PROCEDURE — 84132 ASSAY OF SERUM POTASSIUM: CPT | Performed by: PODIATRIST

## 2020-06-25 RX ORDER — POTASSIUM CHLORIDE 20 MEQ/1
40 TABLET, EXTENDED RELEASE ORAL EVERY 4 HOURS
Status: COMPLETED | OUTPATIENT
Start: 2020-06-25 | End: 2020-06-25

## 2020-06-25 NOTE — PLAN OF CARE
Received patient a/Ox4, RA, NSR on tele at 0700  Patient has wound vac to right foot, 125 negative pressure, 0cc output for shift  Patient up with PT, in chair for meals  Patient started on antibiotics   Right foot elevated on pillow while in bed  [place] and symptoms of electrolyte imbalances  - Administer electrolyte replacement as ordered  - Monitor response to electrolyte replacements, including rhythm and repeat lab results as appropriate  - Fluid restriction as ordered  - Instruct patient on fluid and signs of bleeding to staff  - Avoid use of toothpicks and dental floss  - Use electric shaver for shaving  - Use soft bristle tooth brush  - Limit straining and forceful nose blowing  Outcome: Progressing

## 2020-06-25 NOTE — PROGRESS NOTES
[facility]  Progress Note    Zuleika Grey Patient Status:  Inpatient    1950 MRN [id number]   [facility] 7NE-A Attending Marcelo Cerrato MD   Hosp Day # 3 PCP Joanne Mace MD     Subjective:  Gaby Bowers is a(n) 78year old

## 2020-06-25 NOTE — PROGRESS NOTES
[facility]                INFECTIOUS DISEASE PROGRESS NOTE    Jaronveronica May Que Patient Status:  Inpatient    1950 MRN [id number]   [facility] 7NE-A Attending Denny Tiwari MD   Hosp Day # 3 PCP Pallavi Diaz MD     Antibiotics Enterobacter  -meropenem pending final sensitiviites  Wound vac per podiatry      [facility]  [phone]

## 2020-06-25 NOTE — PLAN OF CARE
Assumed care at [address] [facility]. Pt a/ox4. VSS. NSR per tele. RA. Denies pain. Right foot w/ ace wrap and wound vac intact. Left groin soft, no hematoma noted. Pedals per doppler. Pt updated w/ POC. Will continue to monitor.

## 2020-06-25 NOTE — PHYSICAL THERAPY NOTE
PHYSICAL THERAPY EVALUATION - INPATIENT     Room Number: 3223/6450-R  Evaluation Date: 6/25/2020  Type of Evaluation: Initial  Physician Order: PT Eval and Treat    Presenting Problem: R foot ulcer s/p I & D R LE 6/24/20  Reason for Therapy: Mobility STRENGTH ASSESSMENT  Upper extremity ROM and strength are within functional limits     Lower extremity ROM is within functional limits except limited R ankle ROM    Lower extremity strength is within functional limits except for the following:    Right Hip time on R LE. Pt returned to sitting up in bedside chair. Pt educated on activity recommendations. Pt left with call light in reach. RN aware.      Exercise/Education Provided:  Bed mobility  Energy conservation  Functional activity tolerated  Gait training #1 Patient is able to demonstrate supine - sit EOB @ level: supervision- MET     Goal #2 Patient is able to demonstrate transfers EOB to/from [facility] at assistance level: supervision     Goal #3 Patient is able to ambulate 150 feet with assist device: walker - Patient

## 2020-06-26 LAB
ALBUMIN SERPL-MCNC: 3 G/DL (ref 3.4–5)
ALBUMIN/GLOB SERPL: 0.8 {RATIO} (ref 1–2)
ALP LIVER SERPL-CCNC: 90 U/L (ref 45–117)
ALT SERPL-CCNC: 18 U/L (ref 16–61)
ANION GAP SERPL CALC-SCNC: 6 MMOL/L (ref 0–18)
AST SERPL-CCNC: 15 U/L (ref 15–37)
BASOPHILS # BLD AUTO: 0.04 X10(3) UL (ref 0–0.2)
BASOPHILS NFR BLD AUTO: 0.8 %
BILIRUB SERPL-MCNC: 1 MG/DL (ref 0.1–2)
BUN BLD-MCNC: 14 MG/DL (ref 7–18)
BUN/CREAT SERPL: 14.6 (ref 10–20)
CALCIUM BLD-MCNC: 8.6 MG/DL (ref 8.5–10.1)
CHLORIDE SERPL-SCNC: 109 MMOL/L (ref 98–112)
CO2 SERPL-SCNC: 22 MMOL/L (ref 21–32)
CREAT BLD-MCNC: 0.96 MG/DL (ref 0.7–1.3)
DEPRECATED RDW RBC AUTO: 44.9 FL (ref 35.1–46.3)
EOSINOPHIL # BLD AUTO: 0.1 X10(3) UL (ref 0–0.7)
EOSINOPHIL NFR BLD AUTO: 1.9 %
ERYTHROCYTE [DISTWIDTH] IN BLOOD BY AUTOMATED COUNT: 14 % (ref 11–15)
GLOBULIN PLAS-MCNC: 3.6 G/DL (ref 2.8–4.4)
GLUCOSE BLD-MCNC: 145 MG/DL (ref 70–99)
GLUCOSE BLD-MCNC: 150 MG/DL (ref 70–99)
GLUCOSE BLD-MCNC: 157 MG/DL (ref 70–99)
GLUCOSE BLD-MCNC: 230 MG/DL (ref 70–99)
GLUCOSE BLD-MCNC: 243 MG/DL (ref 70–99)
HCT VFR BLD AUTO: 38.1 % (ref 39–53)
HGB BLD-MCNC: 12.7 G/DL (ref 13–17.5)
IMM GRANULOCYTES # BLD AUTO: 0.02 X10(3) UL (ref 0–1)
IMM GRANULOCYTES NFR BLD: 0.4 %
LYMPHOCYTES # BLD AUTO: 1.56 X10(3) UL (ref 1–4)
LYMPHOCYTES NFR BLD AUTO: 29.4 %
M PROTEIN MFR SERPL ELPH: 6.6 G/DL (ref 6.4–8.2)
MCH RBC QN AUTO: 29.5 PG (ref 26–34)
MCHC RBC AUTO-ENTMCNC: 33.3 G/DL (ref 31–37)
MCV RBC AUTO: 88.6 FL (ref 80–100)
MONOCYTES # BLD AUTO: 0.62 X10(3) UL (ref 0.1–1)
MONOCYTES NFR BLD AUTO: 11.7 %
NEUTROPHILS # BLD AUTO: 2.96 X10 (3) UL (ref 1.5–7.7)
NEUTROPHILS # BLD AUTO: 2.96 X10(3) UL (ref 1.5–7.7)
NEUTROPHILS NFR BLD AUTO: 55.8 %
OSMOLALITY SERPL CALC.SUM OF ELEC: 287 MOSM/KG (ref 275–295)
PLATELET # BLD AUTO: 277 10(3)UL (ref 150–450)
POTASSIUM SERPL-SCNC: 3.7 MMOL/L (ref 3.5–5.1)
RBC # BLD AUTO: 4.3 X10(6)UL (ref 3.8–5.8)
SODIUM SERPL-SCNC: 137 MMOL/L (ref 136–145)
WBC # BLD AUTO: 5.3 X10(3) UL (ref 4–11)

## 2020-06-26 PROCEDURE — 82962 GLUCOSE BLOOD TEST: CPT

## 2020-06-26 PROCEDURE — 85025 COMPLETE CBC W/AUTO DIFF WBC: CPT | Performed by: INTERNAL MEDICINE

## 2020-06-26 PROCEDURE — 97605 NEG PRS WND THER DME<=50SQCM: CPT

## 2020-06-26 PROCEDURE — 80053 COMPREHEN METABOLIC PANEL: CPT | Performed by: INTERNAL MEDICINE

## 2020-06-26 RX ORDER — POTASSIUM CHLORIDE 20 MEQ/1
40 TABLET, EXTENDED RELEASE ORAL ONCE
Status: COMPLETED | OUTPATIENT
Start: 2020-06-26 | End: 2020-06-26

## 2020-06-26 NOTE — CM/SW NOTE
BCAILIO placed [employer] wound vac form on chart. BACILIO paged Dr. Veto Schmidt requesting signature on form. Will need to fax form to [facility] at [phone]. Filemon Nunes LCSW  /Discharge Planner  [phone]    [employer] form faxed.     Filemon Nunes LCSW  So

## 2020-06-26 NOTE — PLAN OF CARE
Assumed care at [address] [place] [address]. Pt a/ox4. VSS. NSR per tele. RA. Denies pain. On IV abx. Right foot w/ wound vac in place. Right foot wrap w/ ace wrap, c/d/i. Pt updated w/ POC. Will continue to monitor.

## 2020-06-26 NOTE — CONSULTS
[facility]  Report of Inpatient Wound Care Consultation     Ajit Grey Patient Status:  Inpatient    1950 MRN [id number]   [facility] 7NE-A Attending Joy Stanton MD   Hosp Day # 4 PCP Dede Jimenez MD     [address] RECOMMENDATIONS:  Received verbal order from  To Corinne Peña, PT, MPT for \"Physical Therapy wound care evaluate and treat. \"  Patient with surgical wound to R dorsal foot, s/p s/p I&D by Dr. Rosalba Medrano and placement of Integra skin substitute and VAC o

## 2020-06-26 NOTE — CM/SW NOTE
[employer] will deliver wound vac supplies tomorrow to pt's room. [facility] will need notification of dc date. [phone] opt 1.     GARY MarinaW  /Discharge Planner  [phone]    Left messages for pt's sister on her cell and ho

## 2020-06-26 NOTE — PROGRESS NOTES
[facility]                INFECTIOUS DISEASE PROGRESS NOTE    Snehalalfonzo Grey Patient Status:  Inpatient    1950 MRN [id number]   [facility] 7NE-A Attending Leandra Alanis MD   Hosp Day # 4 PCP Claudia Bryant MD     Antibiotics debridement to muscle right foot  Prelim cutlures: Klebsiella, Enterobacter  -meropenem pending final sensitiviites  Wound vac per podiatry      Jessica French MD  [facility]  [phone]

## 2020-06-26 NOTE — PLAN OF CARE
Received pt a/ox4, RA, NSR on tele at 0700  Wound care changed wound vac to R foot, 125 negative pressure, no output for shift  Potassium replaced per protocol  Pt denies any pain at this time  IV abx infusing  Wound vac delivered to room for home  Pt upda normal limits  Description  INTERVENTIONS:  - Monitor labs and rhythm and assess patient for signs and symptoms of electrolyte imbalances  - Administer electrolyte replacement as ordered  - Monitor response to electrolyte replacements, including rhythm and for signs and symptoms of internal bleeding  - Monitor lab trends  - Patient is to report abnormal signs of bleeding to staff  - Avoid use of toothpicks and dental floss  - Use electric shaver for shaving  - Use soft bristle tooth brush  - Limit straining

## 2020-06-26 NOTE — WOUND PROGRESS NOTE
[facility]  Report of Inpatient Wound Care Progress Note    Roselia Grey Patient Status:  Inpatient    1950 MRN [id number]   [facility] 7NE-A Attending Beti Hardy MD   Hosp Day # 4 PCP Arias Milton MD       SUBJECTIVE:  No visualize. Will change dressing on Monday, awaiting home unit. Has clinic f/u in 1 week as well. Per Dr. Alberto Porter will be changed on Monday and then not again until he comes back to clinic.       Durable Medical Equipment  Offloading/Footwear  Co

## 2020-06-26 NOTE — CM/SW NOTE
Order written for BACILIO to assist with wound vac. BACILIO spoke to Rimma at [facility]- she sent me email of [employer] form. BACILIO spoke to RN- Josue Boast from [address] [facility] pt now. Will talk to Josue Boast to confirm she is working on wound vac form.     Leatha , LCSW  Social Wo

## 2020-06-27 LAB
GLUCOSE BLD-MCNC: 112 MG/DL (ref 70–99)
GLUCOSE BLD-MCNC: 162 MG/DL (ref 70–99)
GLUCOSE BLD-MCNC: 262 MG/DL (ref 70–99)
GLUCOSE BLD-MCNC: 78 MG/DL (ref 70–99)
POTASSIUM SERPL-SCNC: 3.6 MMOL/L (ref 3.5–5.1)

## 2020-06-27 PROCEDURE — 84132 ASSAY OF SERUM POTASSIUM: CPT | Performed by: INTERNAL MEDICINE

## 2020-06-27 PROCEDURE — 82962 GLUCOSE BLOOD TEST: CPT

## 2020-06-27 RX ORDER — POTASSIUM CHLORIDE 20 MEQ/1
40 TABLET, EXTENDED RELEASE ORAL EVERY 4 HOURS
Status: COMPLETED | OUTPATIENT
Start: 2020-06-27 | End: 2020-06-27

## 2020-06-27 NOTE — PLAN OF CARE
Assumed care 1900  Patient alert and oriented x4  Small mucous BMS yellow/clear  Soft call light used for visually impaired patient  Wound vac to R leg  Pedals palpable on L leg- dressing on R leg   Denies pain  NSR on tele

## 2020-06-27 NOTE — PROGRESS NOTES
[facility]. Progress Note    Shannan Grey Patient Status:  Inpatient    1950 MRN [id number]   [facility] 7NE-A Attending Dannielle Bernard MD   Hosp Day # 5 PCP Raúl Hdez MD     Subjective:  [address] i intact, pt legally blind                Throat:   Lips, mucosa, and tongue normal   Neck:   Supple, symmetrical, trachea midline       Lungs:     Clear to auscultation bilaterally, respirations unlabored       Heart:    Regular rate and rhythm, S1 and S2 n Chronic dorsal DFU, hx of abscess s/p I&D, r/o OM     CONCLUSION:  There is a large ulceration along the dorsal aspect of the right foot measuring approximately 4.6 x 4.1 x 0.6 cm in the greatest transverse, AP, craniocaudad dimensions.   There is extensive offered no additional history at this time.      FINDINGS:  Pressures, in mm Hg, are:  RIGHT BRACHIAL:              129 RIGHT TIBIALIS POSTERIOR:    90 RIGHT DORSALIS PEDIS:        135 RIGHT GREAT TOE:             49 RIGHT ANKLE/BRACHIAL INDEX:  1.1 RIGHT T

## 2020-06-27 NOTE — PLAN OF CARE
Assumed care of pt @ 8779. A/ox4,VSS. SR on tele. Blind in right eye and blurred vision to L eyes this is at baseline. Pedal pulses palpable and verified per doppler. Uses urinal. Up with 1 and walker to chair and with postop shoe. [address] [place] [address] in place.  No BMs duri normal limits  Description  INTERVENTIONS:  - Monitor labs and rhythm and assess patient for signs and symptoms of electrolyte imbalances  - Administer electrolyte replacement as ordered  - Monitor response to electrolyte replacements, including rhythm and for signs and symptoms of internal bleeding  - Monitor lab trends  - Patient is to report abnormal signs of bleeding to staff  - Avoid use of toothpicks and dental floss  - Use electric shaver for shaving  - Use soft bristle tooth brush  - Limit straining

## 2020-06-28 LAB
ALBUMIN SERPL-MCNC: 3 G/DL (ref 3.4–5)
ALBUMIN/GLOB SERPL: 0.8 {RATIO} (ref 1–2)
ALP LIVER SERPL-CCNC: 88 U/L (ref 45–117)
ALT SERPL-CCNC: 17 U/L (ref 16–61)
ANION GAP SERPL CALC-SCNC: 5 MMOL/L (ref 0–18)
AST SERPL-CCNC: 17 U/L (ref 15–37)
BASOPHILS # BLD AUTO: 0.04 X10(3) UL (ref 0–0.2)
BASOPHILS NFR BLD AUTO: 0.8 %
BILIRUB SERPL-MCNC: 0.8 MG/DL (ref 0.1–2)
BUN BLD-MCNC: 18 MG/DL (ref 7–18)
BUN/CREAT SERPL: 17.6 (ref 10–20)
CALCIUM BLD-MCNC: 8.4 MG/DL (ref 8.5–10.1)
CHLORIDE SERPL-SCNC: 108 MMOL/L (ref 98–112)
CO2 SERPL-SCNC: 27 MMOL/L (ref 21–32)
CREAT BLD-MCNC: 1.02 MG/DL (ref 0.7–1.3)
CRP SERPL-MCNC: 0.79 MG/DL (ref ?–0.3)
DEPRECATED RDW RBC AUTO: 45.4 FL (ref 35.1–46.3)
EOSINOPHIL # BLD AUTO: 0.08 X10(3) UL (ref 0–0.7)
EOSINOPHIL NFR BLD AUTO: 1.6 %
ERYTHROCYTE [DISTWIDTH] IN BLOOD BY AUTOMATED COUNT: 14.1 % (ref 11–15)
GLOBULIN PLAS-MCNC: 3.6 G/DL (ref 2.8–4.4)
GLUCOSE BLD-MCNC: 102 MG/DL (ref 70–99)
GLUCOSE BLD-MCNC: 121 MG/DL (ref 70–99)
GLUCOSE BLD-MCNC: 228 MG/DL (ref 70–99)
GLUCOSE BLD-MCNC: 268 MG/DL (ref 70–99)
GLUCOSE BLD-MCNC: 75 MG/DL (ref 70–99)
HCT VFR BLD AUTO: 36 % (ref 39–53)
HGB BLD-MCNC: 12 G/DL (ref 13–17.5)
IMM GRANULOCYTES # BLD AUTO: 0.02 X10(3) UL (ref 0–1)
IMM GRANULOCYTES NFR BLD: 0.4 %
LYMPHOCYTES # BLD AUTO: 1.4 X10(3) UL (ref 1–4)
LYMPHOCYTES NFR BLD AUTO: 27.3 %
M PROTEIN MFR SERPL ELPH: 6.6 G/DL (ref 6.4–8.2)
MCH RBC QN AUTO: 29.1 PG (ref 26–34)
MCHC RBC AUTO-ENTMCNC: 33.3 G/DL (ref 31–37)
MCV RBC AUTO: 87.4 FL (ref 80–100)
MONOCYTES # BLD AUTO: 0.56 X10(3) UL (ref 0.1–1)
MONOCYTES NFR BLD AUTO: 10.9 %
NEUTROPHILS # BLD AUTO: 3.02 X10 (3) UL (ref 1.5–7.7)
NEUTROPHILS # BLD AUTO: 3.02 X10(3) UL (ref 1.5–7.7)
NEUTROPHILS NFR BLD AUTO: 59 %
OSMOLALITY SERPL CALC.SUM OF ELEC: 292 MOSM/KG (ref 275–295)
PLATELET # BLD AUTO: 288 10(3)UL (ref 150–450)
POTASSIUM SERPL-SCNC: 3.7 MMOL/L (ref 3.5–5.1)
RBC # BLD AUTO: 4.12 X10(6)UL (ref 3.8–5.8)
SED RATE-ML: 23 MM/HR (ref 0–12)
SODIUM SERPL-SCNC: 140 MMOL/L (ref 136–145)
WBC # BLD AUTO: 5.1 X10(3) UL (ref 4–11)

## 2020-06-28 PROCEDURE — 86140 C-REACTIVE PROTEIN: CPT | Performed by: PHYSICIAN ASSISTANT

## 2020-06-28 PROCEDURE — 80053 COMPREHEN METABOLIC PANEL: CPT | Performed by: INTERNAL MEDICINE

## 2020-06-28 PROCEDURE — 82962 GLUCOSE BLOOD TEST: CPT

## 2020-06-28 PROCEDURE — 85652 RBC SED RATE AUTOMATED: CPT | Performed by: PHYSICIAN ASSISTANT

## 2020-06-28 PROCEDURE — 85025 COMPLETE CBC W/AUTO DIFF WBC: CPT | Performed by: INTERNAL MEDICINE

## 2020-06-28 RX ORDER — POTASSIUM CHLORIDE 20 MEQ/1
40 TABLET, EXTENDED RELEASE ORAL ONCE
Status: COMPLETED | OUTPATIENT
Start: 2020-06-28 | End: 2020-06-28

## 2020-06-28 NOTE — PLAN OF CARE
Assumed care of pt @ 4575. A/ox4,VSS. SR on tele. Blind in right eye and blurred vision to L eyes this is at baseline. Pedal pulses palpable and verified per doppler. Uses urinal. Up with 1 and walker to chair and with postop shoe. [address] [place] [address] in place.  No BMs duri normal limits  Description  INTERVENTIONS:  - Monitor labs and rhythm and assess patient for signs and symptoms of electrolyte imbalances  - Administer electrolyte replacement as ordered  - Monitor response to electrolyte replacements, including rhythm and for signs and symptoms of internal bleeding  - Monitor lab trends  - Patient is to report abnormal signs of bleeding to staff  - Avoid use of toothpicks and dental floss  - Use electric shaver for shaving  - Use soft bristle tooth brush  - Limit straining

## 2020-06-28 NOTE — PROGRESS NOTES
[facility] Progress Note    Rachel Grey Patient Status:  Inpatient    1950 MRN [id number]   [facility] 7NE-A Attending Vaishali Stotu MD   [address] Day # 6 PCP Westley Gomez MD     Subjective:  [address] i without obvious abnormality, atraumatic   Eyes:    PERRL, conjunctiva/corneas clear, EOM's intact, pt legally blind                Throat:   Lips, mucosa, and tongue normal   Neck:   Supple, symmetrical, trachea midline       Lungs:     Clear to auscultati imaging. Proton density, T2 weighted and fat suppression sequences are included.   Exam was performed without intravenous gadolinium contrast.  PATIENT STATED HISTORY: (As transcribed by Technologist)   Chronic dorsal DFU, hx of abscess s/p I&D, r/o OM TECHNIQUE:  Doppler waveform of the arterial tree was performed. Pressure measurements were obtained of the lower extremities. PATIENT STATED HISTORY: (As transcribed by Technologist)  Patient offered no additional history at this time.      FINDINGS:  P atherectomy Rt Distal anterior tibial  On Plavix, Art Doppler OK though    Onychomycosis  Terbinafine          Niyah Yañez MD    6/28/2020

## 2020-06-28 NOTE — PROGRESS NOTES
[facility]                INFECTIOUS DISEASE PROGRESS NOTE    Rachel Grey Patient Status:  Inpatient    1950 MRN [id number]   [facility] 7NE-A Attending Vaishali Stout MD   Hosp Day # 6 PCP Westley Gomez MD     Antibiotics suggestive of normal skin mere    Tissue Culture Result 4+ growth Enterobacter cloacae complex (A) N/A    Tissue Culture Result 4+ growth Klebsiella pneumoniae (A) N/A    Tissue Culture Result No Staph aureus or Beta hemolytic Strep Isolated N/A    Tissue

## 2020-06-28 NOTE — PLAN OF CARE
Assumed care 1900  Patient alert and oriented x4  Denies pain  NSR on tele  L pedal palpable  IV antibiotics running  Watery clear BM  Call light within reach  Will monitor

## 2020-06-29 LAB
ALBUMIN SERPL-MCNC: 3 G/DL (ref 3.4–5)
ALBUMIN/GLOB SERPL: 0.8 {RATIO} (ref 1–2)
ALP LIVER SERPL-CCNC: 97 U/L (ref 45–117)
ALT SERPL-CCNC: 19 U/L (ref 16–61)
ANION GAP SERPL CALC-SCNC: 5 MMOL/L (ref 0–18)
AST SERPL-CCNC: 18 U/L (ref 15–37)
BASOPHILS # BLD AUTO: 0.04 X10(3) UL (ref 0–0.2)
BASOPHILS NFR BLD AUTO: 0.8 %
BILIRUB SERPL-MCNC: 1 MG/DL (ref 0.1–2)
BUN BLD-MCNC: 17 MG/DL (ref 7–18)
BUN/CREAT SERPL: 15.9 (ref 10–20)
CALCIUM BLD-MCNC: 8.7 MG/DL (ref 8.5–10.1)
CHLORIDE SERPL-SCNC: 107 MMOL/L (ref 98–112)
CO2 SERPL-SCNC: 27 MMOL/L (ref 21–32)
CREAT BLD-MCNC: 1.07 MG/DL (ref 0.7–1.3)
DEPRECATED RDW RBC AUTO: 45 FL (ref 35.1–46.3)
EOSINOPHIL # BLD AUTO: 0.09 X10(3) UL (ref 0–0.7)
EOSINOPHIL NFR BLD AUTO: 1.9 %
ERYTHROCYTE [DISTWIDTH] IN BLOOD BY AUTOMATED COUNT: 13.9 % (ref 11–15)
GLOBULIN PLAS-MCNC: 3.9 G/DL (ref 2.8–4.4)
GLUCOSE BLD-MCNC: 128 MG/DL (ref 70–99)
GLUCOSE BLD-MCNC: 137 MG/DL (ref 70–99)
GLUCOSE BLD-MCNC: 138 MG/DL (ref 70–99)
GLUCOSE BLD-MCNC: 203 MG/DL (ref 70–99)
GLUCOSE BLD-MCNC: 204 MG/DL (ref 70–99)
HCT VFR BLD AUTO: 38.6 % (ref 39–53)
HGB BLD-MCNC: 12.6 G/DL (ref 13–17.5)
IMM GRANULOCYTES # BLD AUTO: 0.01 X10(3) UL (ref 0–1)
IMM GRANULOCYTES NFR BLD: 0.2 %
LYMPHOCYTES # BLD AUTO: 1.49 X10(3) UL (ref 1–4)
LYMPHOCYTES NFR BLD AUTO: 31 %
M PROTEIN MFR SERPL ELPH: 6.9 G/DL (ref 6.4–8.2)
MCH RBC QN AUTO: 29.1 PG (ref 26–34)
MCHC RBC AUTO-ENTMCNC: 32.6 G/DL (ref 31–37)
MCV RBC AUTO: 89.1 FL (ref 80–100)
MONOCYTES # BLD AUTO: 0.48 X10(3) UL (ref 0.1–1)
MONOCYTES NFR BLD AUTO: 10 %
NEUTROPHILS # BLD AUTO: 2.7 X10 (3) UL (ref 1.5–7.7)
NEUTROPHILS # BLD AUTO: 2.7 X10(3) UL (ref 1.5–7.7)
NEUTROPHILS NFR BLD AUTO: 56.1 %
OSMOLALITY SERPL CALC.SUM OF ELEC: 292 MOSM/KG (ref 275–295)
PLATELET # BLD AUTO: 307 10(3)UL (ref 150–450)
POTASSIUM SERPL-SCNC: 4 MMOL/L (ref 3.5–5.1)
RBC # BLD AUTO: 4.33 X10(6)UL (ref 3.8–5.8)
SODIUM SERPL-SCNC: 139 MMOL/L (ref 136–145)
WBC # BLD AUTO: 4.8 X10(3) UL (ref 4–11)

## 2020-06-29 PROCEDURE — 82962 GLUCOSE BLOOD TEST: CPT

## 2020-06-29 PROCEDURE — 97116 GAIT TRAINING THERAPY: CPT

## 2020-06-29 PROCEDURE — 80053 COMPREHEN METABOLIC PANEL: CPT | Performed by: INTERNAL MEDICINE

## 2020-06-29 PROCEDURE — 76937 US GUIDE VASCULAR ACCESS: CPT

## 2020-06-29 PROCEDURE — 97530 THERAPEUTIC ACTIVITIES: CPT

## 2020-06-29 PROCEDURE — 85025 COMPLETE CBC W/AUTO DIFF WBC: CPT | Performed by: INTERNAL MEDICINE

## 2020-06-29 PROCEDURE — 36410 VNPNXR 3YR/> PHY/QHP DX/THER: CPT

## 2020-06-29 NOTE — CM/SW NOTE
PT saw pt again and is still recommending HHPT with intermittent supervision. Clarified that pt's sister does not want pt to go to [facility]. [employer] wound vac was delivered to pt's room. [facility] [phone] accepted pt.   Per Dr Elena Spears, pt most likely can go

## 2020-06-29 NOTE — WOUND PROGRESS NOTE
[facility]  Inpatient Wound Care Contact Note    Rachel Nortonin Patient Status:  Inpatient    1950 MRN [id number]   [facility] 7NE-A Attending Vaishali Stout MD   Hosp Day # 7 PCP Westley Gomez MD     Talked to RN, discharge plann

## 2020-06-29 NOTE — DIETARY NOTE
[address] [place]     Admitting diagnosis:  diabetic foot ulcer    Ht: 177.8 cm (5' 10\")  Wt: 78.1 kg (172 lb 3.2 oz). This is 103 % of IBW  Body mass index is 24.71 kg/m².   IBW: 75.5 kg    Labs/Meds reviewed    Diet:

## 2020-06-29 NOTE — PLAN OF CARE
Patient is alert and oriented. Denies pain or nausea. Oxygen saturation remains above 90% on room air. NSR on telemetry. Pulses present by doppler. Dressing to right foot dry and intact. Wound vac in place.  Pivots from wheelchair to bed with one assist. Pl

## 2020-06-29 NOTE — PHYSICAL THERAPY NOTE
PHYSICAL THERAPY TREATMENT NOTE - INPATIENT    Room Number: 1262/6686-Q     Session: 1   Number of Visits to Meet Established Goals: 3    Presenting Problem: R foot ulcer s/p I & D R LE 6/24/20     Presenting Problem: R foot ulcer s/p I & D R LE 6/24/20 have...  -   Turning over in bed (including adjusting bedclothes, sheets and blankets)?: A Little   -   Sitting down on and standing up from a chair with arms (e.g., wheelchair, bedside commode, etc.): A Little   -   Moving from lying on back to sitting on impairments: decreased balance, gait dysfunction, decreased strength, decreased endurance. Functional outcome measures completed include AMPAC.  These impairments and comorbidities manifest themselves as functional limitations in independent bed mobility,

## 2020-06-29 NOTE — PROGRESS NOTES
[facility]                INFECTIOUS DISEASE PROGRESS NOTE    Chance Aurora Grey Patient Status:  Inpatient    1950 MRN [id number]   [facility] 7NE-A Attending Marjorie Amaral MD   Hosp Day # 7 PCP Ailyn Gaytan MD     Antibiotics Tissue Culture Result  N/A     Mixture of organisms suggestive of normal skin mere    Tissue Culture Result 4+ growth Enterobacter cloacae complex (A) N/A    Tissue Culture Result 4+ growth Klebsiella pneumoniae (A) N/A    Tissue Culture Result No Staph a

## 2020-06-29 NOTE — PROGRESS NOTES
[facility] Progress Note    Randyisi Nortonin Patient Status:  Inpatient    1950 MRN [id number]   [facility] 7NE-A Attending Jann Little MD   [address] [facility] [address] Day # 7 PCP Negro Cline MD     Subjective:  [address] i Normocephalic, without obvious abnormality, atraumatic   Eyes:    PERRL, conjunctiva/corneas clear, EOM's intact, pt legally blind                Throat:   Lips, mucosa, and tongue normal   Neck:   Supple, symmetrical, trachea midline       Lungs:     Paty s/p I&D, r/o OM  TECHNIQUE:  Multiplanar imaging of the foot is acquired including axial, sagittal and coronal imaging. Proton density, T2 weighted and fat suppression sequences are included.   Exam was performed without intravenous gadolinium contrast.  PA 6/23/2020  PROCEDURE:  US ANKLE TOE BRACHIAL INDEX BILATERAL (CPT=93922)  COMPARISON:  None. INDICATIONS:  DFU  TECHNIQUE:  Doppler waveform of the arterial tree was performed. Pressure measurements were obtained of the lower extremities.   PATIENT STATED (peripheral artery disease) ([facility]  S. P.balloon angioplasty Rt Peroneal,Rt Post Tibial and Rt Ant Tibial , Laser atherectomy Rt Distal anterior tibial  On Plavix, Art Doppler OK though    Onychomycosis  Terbinafine          FLAKITA BARRY ATELMD    6/29/2020

## 2020-06-30 VITALS
SYSTOLIC BLOOD PRESSURE: 118 MMHG | TEMPERATURE: 98 F | WEIGHT: 172.19 LBS | BODY MASS INDEX: 24.65 KG/M2 | HEIGHT: 70 IN | OXYGEN SATURATION: 96 % | DIASTOLIC BLOOD PRESSURE: 61 MMHG | RESPIRATION RATE: 19 BRPM | HEART RATE: 70 BPM

## 2020-06-30 LAB
ALBUMIN SERPL-MCNC: 3 G/DL (ref 3.4–5)
ALBUMIN/GLOB SERPL: 0.8 {RATIO} (ref 1–2)
ALP LIVER SERPL-CCNC: 92 U/L (ref 45–117)
ALT SERPL-CCNC: 19 U/L (ref 16–61)
ANION GAP SERPL CALC-SCNC: 6 MMOL/L (ref 0–18)
AST SERPL-CCNC: 11 U/L (ref 15–37)
BASOPHILS # BLD AUTO: 0.05 X10(3) UL (ref 0–0.2)
BASOPHILS NFR BLD AUTO: 1.1 %
BILIRUB SERPL-MCNC: 0.9 MG/DL (ref 0.1–2)
BUN BLD-MCNC: 18 MG/DL (ref 7–18)
BUN/CREAT SERPL: 19.1 (ref 10–20)
CALCIUM BLD-MCNC: 8.9 MG/DL (ref 8.5–10.1)
CHLORIDE SERPL-SCNC: 107 MMOL/L (ref 98–112)
CO2 SERPL-SCNC: 25 MMOL/L (ref 21–32)
CREAT BLD-MCNC: 0.94 MG/DL (ref 0.7–1.3)
DEPRECATED RDW RBC AUTO: 44.6 FL (ref 35.1–46.3)
EOSINOPHIL # BLD AUTO: 0.06 X10(3) UL (ref 0–0.7)
EOSINOPHIL NFR BLD AUTO: 1.3 %
ERYTHROCYTE [DISTWIDTH] IN BLOOD BY AUTOMATED COUNT: 13.8 % (ref 11–15)
GLOBULIN PLAS-MCNC: 3.6 G/DL (ref 2.8–4.4)
GLUCOSE BLD-MCNC: 156 MG/DL (ref 70–99)
GLUCOSE BLD-MCNC: 157 MG/DL (ref 70–99)
GLUCOSE BLD-MCNC: 191 MG/DL (ref 70–99)
HCT VFR BLD AUTO: 37.2 % (ref 39–53)
HGB BLD-MCNC: 12.4 G/DL (ref 13–17.5)
IMM GRANULOCYTES # BLD AUTO: 0.01 X10(3) UL (ref 0–1)
IMM GRANULOCYTES NFR BLD: 0.2 %
LYMPHOCYTES # BLD AUTO: 1.38 X10(3) UL (ref 1–4)
LYMPHOCYTES NFR BLD AUTO: 29.1 %
M PROTEIN MFR SERPL ELPH: 6.6 G/DL (ref 6.4–8.2)
MCH RBC QN AUTO: 29.3 PG (ref 26–34)
MCHC RBC AUTO-ENTMCNC: 33.3 G/DL (ref 31–37)
MCV RBC AUTO: 87.9 FL (ref 80–100)
MONOCYTES # BLD AUTO: 0.46 X10(3) UL (ref 0.1–1)
MONOCYTES NFR BLD AUTO: 9.7 %
NEUTROPHILS # BLD AUTO: 2.79 X10 (3) UL (ref 1.5–7.7)
NEUTROPHILS # BLD AUTO: 2.79 X10(3) UL (ref 1.5–7.7)
NEUTROPHILS NFR BLD AUTO: 58.6 %
OSMOLALITY SERPL CALC.SUM OF ELEC: 291 MOSM/KG (ref 275–295)
PLATELET # BLD AUTO: 306 10(3)UL (ref 150–450)
POTASSIUM SERPL-SCNC: 3.6 MMOL/L (ref 3.5–5.1)
RBC # BLD AUTO: 4.23 X10(6)UL (ref 3.8–5.8)
SODIUM SERPL-SCNC: 138 MMOL/L (ref 136–145)
WBC # BLD AUTO: 4.8 X10(3) UL (ref 4–11)

## 2020-06-30 PROCEDURE — 80053 COMPREHEN METABOLIC PANEL: CPT | Performed by: FAMILY MEDICINE

## 2020-06-30 PROCEDURE — 97535 SELF CARE MNGMENT TRAINING: CPT

## 2020-06-30 PROCEDURE — 82962 GLUCOSE BLOOD TEST: CPT

## 2020-06-30 PROCEDURE — 85025 COMPLETE CBC W/AUTO DIFF WBC: CPT | Performed by: FAMILY MEDICINE

## 2020-06-30 PROCEDURE — 97165 OT EVAL LOW COMPLEX 30 MIN: CPT

## 2020-06-30 RX ORDER — METRONIDAZOLE 500 MG/1
500 TABLET ORAL 2 TIMES DAILY
Qty: 20 TABLET | Refills: 0 | Status: SHIPPED | OUTPATIENT
Start: 2020-06-30 | End: 2020-07-10

## 2020-06-30 RX ORDER — LEVOFLOXACIN 750 MG/1
750 TABLET ORAL DAILY
Qty: 10 TABLET | Refills: 0 | Status: SHIPPED | OUTPATIENT
Start: 2020-06-30 | End: 2020-07-10

## 2020-06-30 RX ORDER — TERBINAFINE HYDROCHLORIDE 250 MG/1
250 TABLET ORAL DAILY
Qty: 30 TABLET | Refills: 0 | Status: SHIPPED | OUTPATIENT
Start: 2020-07-01

## 2020-06-30 RX ORDER — POTASSIUM CHLORIDE 20 MEQ/1
40 TABLET, EXTENDED RELEASE ORAL EVERY 4 HOURS
Status: COMPLETED | OUTPATIENT
Start: 2020-06-30 | End: 2020-06-30

## 2020-06-30 RX ORDER — CLOPIDOGREL BISULFATE 75 MG/1
75 TABLET ORAL DAILY
Qty: 90 TABLET | Refills: 0 | Status: SHIPPED | OUTPATIENT
Start: 2020-07-01

## 2020-06-30 NOTE — PLAN OF CARE
Assumed care at 0730  A&Ox4  On RA  NSR on tele  Voiding per urinal  Denies pain  Wound vac intact to RLE  Midline placed today for possible home IV Abx  Up x1 assist WBAT, tolerated well  Sitting up in own W/C  Sister at bedside updated on [facility] for h

## 2020-06-30 NOTE — PLAN OF CARE
NURSING DISCHARGE NOTE    Discharged Home via Wheelchair. Accompanied by Family member and Support staff  Belongings Taken by patient/family.     Received patient A/Ox4, ZOEY, NSR on tele at 0700  Patient denying any pain  Patient with wound vac to right ordered  - Assess for signs and symptoms of hyperglycemia and hypoglycemia  - Administer ordered medications to maintain glucose within target range  - Assess barriers to adequate nutritional intake and initiate nutrition consult as needed  - Instruct rashawn products/factors as ordered and appropriate  Outcome: Adequate for Discharge  Goal: Free from bleeding injury  Description  (Example usage: patient with low platelets)  INTERVENTIONS:  - Avoid intramuscular injections, enemas and rectal medication administ

## 2020-06-30 NOTE — CM/SW NOTE
Pt is ready for d/c today. Pt will go home with [facility] - spoke with Anne Hansen at [facility] who said they will see pt tomorrow. Pt will go home with the [facility] wound vac. AVS sent to [facility]  via [facility]. Pt will not need IV ABX.

## 2020-06-30 NOTE — CM/SW NOTE
06/30/20 1600   Discharge disposition   Expected discharge disposition Home-Health   Name of [address] services after discharge Skilled home care   Discharge transportation Private car

## 2020-06-30 NOTE — PROGRESS NOTES
[facility]                INFECTIOUS DISEASE PROGRESS NOTE    Thelbert Apgar [place] Patient Status:  Inpatient    1950 MRN [id number]   [facility] 7NE-A Attending Danielle Winters MD   Hosp Day # 8 PCP Donato Danielson MD     Antibiotics Culture Result 4+ growth Enterobacter cloacae complex (A) N/A    Tissue Culture Result 4+ growth Klebsiella pneumoniae (A) N/A    Tissue Culture Result No Staph aureus or Beta hemolytic Strep Isolated N/A    Tissue Smear 1+ WBCs seen N/A    Tissue Smear No

## 2020-06-30 NOTE — OCCUPATIONAL THERAPY NOTE
OCCUPATIONAL THERAPY EVALUATION - INPATIENT     Room Number: 5116/7114-M  Evaluation Date: 6/30/2020  Type of Evaluation: Initial  Presenting Problem: R foot ulcer, s/p I&D R foot ulcer on 6/24    Physician Order: IP Consult to Occupational Therapy  Reason OBJECTIVE  Precautions: Low vision; Other (Comment)(Wound VAC)  Fall Risk: High fall risk    WEIGHT BEARING RESTRICTION  Weight Bearing Restriction: R lower extremity        R Lower Extremity: Weight Bearing as Tolerated(with surgical shoe)       PAIN A to sit. CGA to lower brief. Independent hygiene care. SBA to stand and min A to place new brief on. SBA with RW for functional ambulation in the hallway. Cueing provided for directions and RW placement, since pt is legally blind.  Discussed plan of care w PLAN  OT Treatment Plan: Balance activities; Energy conservation/work simplification techniques;ADL training;Functional transfer training;UE strengthening/ROM; Patient/Family education;Patient/Family training;Equipment eval/education; Compensatory techniq

## 2020-06-30 NOTE — PLAN OF CARE
Assumed care at [address] [place] [address]. Pt a/ox4, forgetful at times. VSS. NSR per tele. RA. Right foot dressing c/d/i. Wound vac in place. Pedals per doppler. On IV abx. Will continue to monitor. Plan: home at discharge.

## 2020-06-30 NOTE — PROGRESS NOTES
[facility] Progress Note    Barb Cheese [place] Patient Status:  Inpatient    1950 MRN [id number]   [facility] 7NE-A Attending Prema Ross MD   [facility] Day # 8 PCP Marjorie Cano MD     Subjective:  [address] i cooperative, no distress, appears stated age   Head:    Normocephalic, without obvious abnormality, atraumatic   Eyes:    PERRL, conjunctiva/corneas clear, EOM's intact, pt legally blind                Throat:   Lips, mucosa, and tongue normal   Neck:   Padgett abscess s/p I&D, r/o OM  TECHNIQUE:  Multiplanar imaging of the foot is acquired including axial, sagittal and coronal imaging. Proton density, T2 weighted and fat suppression sequences are included.   Exam was performed without intravenous gadolinium contr (pqx=[id number])    Result Date: 6/23/2020  PROCEDURE:  US ANKLE TOE BRACHIAL INDEX BILATERAL (CPT=93922)  COMPARISON:  None. INDICATIONS:  DFU  TECHNIQUE:  Doppler waveform of the arterial tree was performed.   Pressure measurements were obtained of the lower hypertriglyceridemia  LDL goal below 70      Blindness, legal  CPM      PAD (peripheral artery disease) ([facility]  S. P.balloon angioplasty Rt Peroneal,Rt Post Tibial and Rt Ant Tibial , Laser atherectomy Rt Distal anterior tibial  On Plavix, Art Doppler OK tho

## 2020-06-30 NOTE — WOUND PROGRESS NOTE
[facility]  Report of Inpatient Wound Care Progress Note    Zuleika Castillosudhir Grey Patient Status:  Inpatient    1950 MRN [id number]   [facility] 7NE-A Attending Marcelo Cerrato MD   Hosp Day # 8 PCP Joanne Mace MD       SUBJECTIVE:  Adriana Baez used. Suction set to 125mmHg, continuous. Seal intact. ABD pad to top of foot, wrapped with kerlix. Educated family member on home wound vac device, charging, changing cannister, troubleshooting.          Offloading/Footwear  Compression:  Surgical shoe

## 2020-07-06 ENCOUNTER — OFFICE VISIT (OUTPATIENT)
Dept: WOUND CARE | Facility: HOSPITAL | Age: 70
End: 2020-07-06
Attending: PODIATRIST
Payer: MEDICARE

## 2020-07-06 DIAGNOSIS — L97.512 NON-PRESSURE CHRONIC ULCER OF OTHER PART OF RIGHT FOOT WITH FAT LAYER EXPOSED (HCC): ICD-10-CM

## 2020-07-06 DIAGNOSIS — L97.402 DIABETIC ULCER OF MIDFOOT ASSOCIATED WITH DIABETES MELLITUS DUE TO UNDERLYING CONDITION, WITH FAT LAYER EXPOSED, UNSPECIFIED LATERALITY (HCC): Primary | ICD-10-CM

## 2020-07-06 DIAGNOSIS — E08.621 DIABETIC ULCER OF MIDFOOT ASSOCIATED WITH DIABETES MELLITUS DUE TO UNDERLYING CONDITION, WITH FAT LAYER EXPOSED, UNSPECIFIED LATERALITY (HCC): Primary | ICD-10-CM

## 2020-07-06 LAB — GLUCOSE BLD-MCNC: 75 MG/DL (ref 70–99)

## 2020-07-06 PROCEDURE — 97605 NEG PRS WND THER DME<=50SQCM: CPT

## 2020-07-06 PROCEDURE — 82962 GLUCOSE BLOOD TEST: CPT

## 2020-07-06 NOTE — PROGRESS NOTES
Progress Note Details  Patient Name: Trina Jesnen.   Patient Number: [id number]  Patient YOB: 1950  Date: 7/6/2020  Physician / Ava Ocampo: Kailyn Hoskins 19: Maninder Ferrara    Chief Complaint  This information was obtained from the pat at today's visit. : Yes        Objective    Wound Assessment(s)  Wound #1 Right, Dorsal Foot is a chronic Bruce Grade 2 Diabetic Ulcer and has received an outcome of Converted. Necrotic tendon is exposed.  There is a moderate amount of serous drainage note Ulcer and has received an outcome of Resolved. Subsequent wound encounter measurements are 0cm length x 0cm width with no measurable depth, with an area of 0 sq cm . No tunneling has been noted. No sinus tract has been noted. No undermining has been noted. Device Used:  Ace Wrap  Calf Measurement 37 cm from heel with right measurement of 36.8 cm  Ankle Measurement 12 cm from heel with right measurement of 28.3 cm          Assessment    Active Problems    ICD-10  (Encounter Diagnosis) E11.621 - Type 2 diabetes

## 2020-07-13 ENCOUNTER — OFFICE VISIT (OUTPATIENT)
Dept: WOUND CARE | Facility: HOSPITAL | Age: 70
End: 2020-07-13
Attending: PODIATRIST
Payer: MEDICARE

## 2020-07-13 DIAGNOSIS — E08.621 DIABETIC ULCER OF MIDFOOT ASSOCIATED WITH DIABETES MELLITUS DUE TO UNDERLYING CONDITION, WITH FAT LAYER EXPOSED, UNSPECIFIED LATERALITY (HCC): Primary | ICD-10-CM

## 2020-07-13 DIAGNOSIS — L97.402 DIABETIC ULCER OF MIDFOOT ASSOCIATED WITH DIABETES MELLITUS DUE TO UNDERLYING CONDITION, WITH FAT LAYER EXPOSED, UNSPECIFIED LATERALITY (HCC): Primary | ICD-10-CM

## 2020-07-13 DIAGNOSIS — L97.512 NON-PRESSURE CHRONIC ULCER OF OTHER PART OF RIGHT FOOT WITH FAT LAYER EXPOSED (HCC): ICD-10-CM

## 2020-07-13 LAB — GLUCOSE BLD-MCNC: 112 MG/DL (ref 70–99)

## 2020-07-13 PROCEDURE — 11043 DBRDMT MUSC&/FSCA 1ST 20/<: CPT

## 2020-07-13 PROCEDURE — 82962 GLUCOSE BLOOD TEST: CPT

## 2020-07-13 NOTE — PROGRESS NOTES
Progress Note Details  Patient Name: Hamilton Houston.   Patient Number: [id number]  Patient YOB: 1950  Date: 7/13/2020  Physician / Millie Segovia: Kailyn Crain 19: Bryant Dance    Chief Complaint  This information was obtained from the pa reconciliation completed at today's visit. : Yes        Objective    Wound Assessment(s)  Wound #5 Right, Dorsal Foot is an acute Full Thickness Surgical Wound and has received a status of Not Healed.  Subsequent wound encounter measurements are 4.3cm lengt debridement with a total area debrided of 17.2 sq cm was performed by Lori Rodriguez DPM. Muscle, Subcutaneous, and Tendon were removed along with devitalized tissue: biofilm, fibrin, and slough. The following instrument(s) were used: blade and forceps.  Pain

## 2020-07-14 NOTE — DISCHARGE SUMMARY
[address] Discharge Summary    Snehal Grey Patient Status:  Inpatient    1950 MRN [id number]   [facility] 7NE-A Attending No att. providers found   Hosp Day # 8 PCP Claudia Bryant MD     Date of Admission:  Take 25 mg by mouth daily. , Disp: , Rfl:   Fosinopril Sodium 20 MG Oral Tab, Take 20 mg by mouth daily. , Disp: , Rfl:   amLODIPine Besylate 10 MG Oral Tab, Take 10 mg by mouth daily. , Disp: , Rfl:   hydrALAzine HCl 25 MG Oral Tab, Take 25 mg by mouth 3 (th atraumatic, no cyanosis, 1+ pitting edema rt foot ope wound dry dessicated material    Pulses:   all extremities, pedal pulse not palpable   Skin:    turgor normal, no rashes or lesions   Lymph nodes:   Cervical, supraclavicular, and axillary nodes normal no new issues.  No fever, no cough, no SOB, no pain issues, tolerating antibiotics    Consultations: inf dis,pinky,podiatrist.vascular surgeon    Disposition: [facility]    Discharge Condition: stable    Discharge Medications: Discharge Appointment Department [facility])    Jul 15, 2020  1:30 PM CDT Ultrasound Visit with [facility] [place] [facility] ([facility])        Jul 15, 2020  3:20 PM CDT Post Operative Visit with Derick Love MD [place]

## 2020-07-20 ENCOUNTER — OFFICE VISIT (OUTPATIENT)
Dept: WOUND CARE | Facility: HOSPITAL | Age: 70
End: 2020-07-20
Attending: PODIATRIST
Payer: MEDICARE

## 2020-07-20 DIAGNOSIS — L97.402 DIABETIC ULCER OF MIDFOOT ASSOCIATED WITH DIABETES MELLITUS DUE TO UNDERLYING CONDITION, WITH FAT LAYER EXPOSED, UNSPECIFIED LATERALITY (HCC): Primary | ICD-10-CM

## 2020-07-20 DIAGNOSIS — L97.512 NON-PRESSURE CHRONIC ULCER OF OTHER PART OF RIGHT FOOT WITH FAT LAYER EXPOSED (HCC): ICD-10-CM

## 2020-07-20 DIAGNOSIS — E08.621 DIABETIC ULCER OF MIDFOOT ASSOCIATED WITH DIABETES MELLITUS DUE TO UNDERLYING CONDITION, WITH FAT LAYER EXPOSED, UNSPECIFIED LATERALITY (HCC): Primary | ICD-10-CM

## 2020-07-20 DIAGNOSIS — I73.9 PERIPHERAL VASCULAR DISEASE, UNSPECIFIED (HCC): ICD-10-CM

## 2020-07-20 DIAGNOSIS — E11.621 TYPE 2 DIABETES MELLITUS WITH FOOT ULCER (HCC): ICD-10-CM

## 2020-07-20 DIAGNOSIS — L97.509 TYPE 2 DIABETES MELLITUS WITH FOOT ULCER (HCC): ICD-10-CM

## 2020-07-20 LAB — GLUCOSE BLD-MCNC: 88 MG/DL (ref 70–99)

## 2020-07-20 PROCEDURE — 82962 GLUCOSE BLOOD TEST: CPT

## 2020-07-20 PROCEDURE — 15275 SKIN SUB GRAFT FACE/NK/HF/G: CPT

## 2020-07-20 NOTE — PROGRESS NOTES
Progress Note Details  Patient Name: Patricia Zhong.   Patient Number: [id number]  Patient YOB: 1950  Date: 7/20/2020  Physician / Shira Oliva: Kailyn Hassan 19: Bud Hampton    Chief Complaint  This information was obtained from the pa (Central/Peripheral)  Endocrine  Hematologic/Lymphatic  Allergic/Immunologic    Additional Information  Medication reconciliation completed at today's visit. : Yes        Objective    Wound Assessment(s)  Wound #5 Right, Dorsal Foot is an acute Full Thickn is located on the right, dorsal foot.  A skin/subcutaneous tissue/muscle/fascia level excisional/surgical debridement with a total area debrided of 14.4 sq cm was performed by Lori Rodriguez DPM. Muscle, Subcutaneous, and Tendon were removed along with brie summary  Discussed the Plan of Care at bedside with patient. The patient verbally acknowledges understanding of all instructions and all questions were answered. Reviewed and evaluated labs.           Electronic Signature(s)  Signed By: Date:  Hilda Atkins

## 2020-07-25 ENCOUNTER — LAB ENCOUNTER (OUTPATIENT)
Dept: LAB | Facility: HOSPITAL | Age: 70
End: 2020-07-25
Attending: PODIATRIST
Payer: MEDICARE

## 2020-07-25 DIAGNOSIS — L97.519 CHRONIC DIABETIC ULCER OF RIGHT FOOT DETERMINED BY EXAMINATION (HCC): ICD-10-CM

## 2020-07-25 DIAGNOSIS — E11.621 CHRONIC DIABETIC ULCER OF RIGHT FOOT DETERMINED BY EXAMINATION (HCC): ICD-10-CM

## 2020-07-27 ENCOUNTER — APPOINTMENT (OUTPATIENT)
Dept: WOUND CARE | Facility: HOSPITAL | Age: 70
End: 2020-07-27
Attending: PODIATRIST
Payer: MEDICARE

## 2020-07-27 ENCOUNTER — ANESTHESIA EVENT (OUTPATIENT)
Dept: SURGERY | Facility: HOSPITAL | Age: 70
End: 2020-07-27
Payer: MEDICARE

## 2020-07-27 ENCOUNTER — ANESTHESIA (OUTPATIENT)
Dept: SURGERY | Facility: HOSPITAL | Age: 70
End: 2020-07-27
Payer: MEDICARE

## 2020-07-27 ENCOUNTER — HOSPITAL ENCOUNTER (OUTPATIENT)
Facility: HOSPITAL | Age: 70
Setting detail: HOSPITAL OUTPATIENT SURGERY
Discharge: HOME | End: 2020-07-27
Attending: PODIATRIST | Admitting: PODIATRIST
Payer: MEDICARE

## 2020-07-27 VITALS
BODY MASS INDEX: 26.16 KG/M2 | RESPIRATION RATE: 16 BRPM | TEMPERATURE: 97 F | OXYGEN SATURATION: 100 % | WEIGHT: 182.75 LBS | SYSTOLIC BLOOD PRESSURE: 145 MMHG | HEIGHT: 70 IN | DIASTOLIC BLOOD PRESSURE: 73 MMHG | HEART RATE: 55 BPM

## 2020-07-27 DIAGNOSIS — L97.425 DIABETIC ULCER OF LEFT MIDFOOT ASSOCIATED WITH TYPE 2 DIABETES MELLITUS, WITH MUSCLE INVOLVEMENT WITHOUT EVIDENCE OF NECROSIS (HCC): ICD-10-CM

## 2020-07-27 DIAGNOSIS — E11.621 DIABETIC ULCER OF LEFT MIDFOOT ASSOCIATED WITH TYPE 2 DIABETES MELLITUS, WITH MUSCLE INVOLVEMENT WITHOUT EVIDENCE OF NECROSIS (HCC): ICD-10-CM

## 2020-07-27 DIAGNOSIS — E11.621 CHRONIC DIABETIC ULCER OF RIGHT FOOT DETERMINED BY EXAMINATION (HCC): Primary | ICD-10-CM

## 2020-07-27 DIAGNOSIS — L97.519 CHRONIC DIABETIC ULCER OF RIGHT FOOT DETERMINED BY EXAMINATION (HCC): Primary | ICD-10-CM

## 2020-07-27 DIAGNOSIS — E11.42 DIABETIC POLYNEUROPATHY ASSOCIATED WITH TYPE 2 DIABETES MELLITUS (HCC): ICD-10-CM

## 2020-07-27 LAB
ATRIAL RATE: 66 BPM
GLUCOSE BLD-MCNC: 108 MG/DL (ref 70–99)
GLUCOSE BLD-MCNC: 133 MG/DL (ref 70–99)
P AXIS: 44 DEGREES
P-R INTERVAL: 180 MS
Q-T INTERVAL: 418 MS
QRS DURATION: 82 MS
QTC CALCULATION (BEZET): 438 MS
R AXIS: 51 DEGREES
SARS-COV-2 RNA RESP QL NAA+PROBE: NOT DETECTED
SARS-COV-2 RNA RESP QL NAA+PROBE: NOT DETECTED
T AXIS: 80 DEGREES
VENTRICULAR RATE: 66 BPM

## 2020-07-27 PROCEDURE — 93010 ELECTROCARDIOGRAM REPORT: CPT | Performed by: INTERNAL MEDICINE

## 2020-07-27 PROCEDURE — 82962 GLUCOSE BLOOD TEST: CPT

## 2020-07-27 PROCEDURE — 0LBV0ZZ EXCISION OF RIGHT FOOT TENDON, OPEN APPROACH: ICD-10-PCS | Performed by: PODIATRIST

## 2020-07-27 PROCEDURE — 0HRMXK3 REPLACEMENT OF RIGHT FOOT SKIN WITH NONAUTOLOGOUS TISSUE SUBSTITUTE, FULL THICKNESS, EXTERNAL APPROACH: ICD-10-PCS | Performed by: PODIATRIST

## 2020-07-27 PROCEDURE — 93005 ELECTROCARDIOGRAM TRACING: CPT

## 2020-07-27 PROCEDURE — 0KBV0ZZ EXCISION OF RIGHT FOOT MUSCLE, OPEN APPROACH: ICD-10-PCS | Performed by: PODIATRIST

## 2020-07-27 DEVICE — INTEGRA® BILAYER MATRIX WOUND DRESSING 2 IN*2 IN (5 CM*5 CM)
Type: IMPLANTABLE DEVICE | Site: FOOT | Status: FUNCTIONAL
Brand: INTEGRA®

## 2020-07-27 RX ORDER — DEXTROSE MONOHYDRATE 25 G/50ML
50 INJECTION, SOLUTION INTRAVENOUS
Status: DISCONTINUED | OUTPATIENT
Start: 2020-07-27 | End: 2020-07-27

## 2020-07-27 RX ORDER — ACETAMINOPHEN 500 MG
1000 TABLET ORAL ONCE AS NEEDED
Status: DISCONTINUED | OUTPATIENT
Start: 2020-07-27 | End: 2020-07-27

## 2020-07-27 RX ORDER — NALOXONE HYDROCHLORIDE 0.4 MG/ML
80 INJECTION, SOLUTION INTRAMUSCULAR; INTRAVENOUS; SUBCUTANEOUS AS NEEDED
Status: DISCONTINUED | OUTPATIENT
Start: 2020-07-27 | End: 2020-07-27

## 2020-07-27 RX ORDER — ONDANSETRON 2 MG/ML
4 INJECTION INTRAMUSCULAR; INTRAVENOUS AS NEEDED
Status: DISCONTINUED | OUTPATIENT
Start: 2020-07-27 | End: 2020-07-27

## 2020-07-27 RX ORDER — SODIUM CHLORIDE, SODIUM LACTATE, POTASSIUM CHLORIDE, CALCIUM CHLORIDE 600; 310; 30; 20 MG/100ML; MG/100ML; MG/100ML; MG/100ML
INJECTION, SOLUTION INTRAVENOUS CONTINUOUS
Status: DISCONTINUED | OUTPATIENT
Start: 2020-07-27 | End: 2020-07-27

## 2020-07-27 RX ORDER — CEFAZOLIN SODIUM/WATER 2 G/20 ML
2 SYRINGE (ML) INTRAVENOUS ONCE
Status: COMPLETED | OUTPATIENT
Start: 2020-07-27 | End: 2020-07-27

## 2020-07-27 RX ORDER — ACETAMINOPHEN 500 MG
1000 TABLET ORAL ONCE
Status: DISCONTINUED | OUTPATIENT
Start: 2020-07-27 | End: 2020-07-27 | Stop reason: HOSPADM

## 2020-07-27 RX ORDER — DEXTROSE MONOHYDRATE 25 G/50ML
50 INJECTION, SOLUTION INTRAVENOUS
Status: DISCONTINUED | OUTPATIENT
Start: 2020-07-27 | End: 2020-07-27 | Stop reason: HOSPADM

## 2020-07-27 RX ORDER — HYDROCODONE BITARTRATE AND ACETAMINOPHEN 5; 325 MG/1; MG/1
1 TABLET ORAL AS NEEDED
Status: DISCONTINUED | OUTPATIENT
Start: 2020-07-27 | End: 2020-07-27

## 2020-07-27 RX ORDER — ACETAMINOPHEN 500 MG
1000 TABLET ORAL ONCE
COMMUNITY

## 2020-07-27 RX ORDER — HYDROCODONE BITARTRATE AND ACETAMINOPHEN 5; 325 MG/1; MG/1
2 TABLET ORAL AS NEEDED
Status: DISCONTINUED | OUTPATIENT
Start: 2020-07-27 | End: 2020-07-27

## 2020-07-27 RX ORDER — INSULIN ASPART 100 [IU]/ML
INJECTION, SOLUTION INTRAVENOUS; SUBCUTANEOUS ONCE
Status: DISCONTINUED | OUTPATIENT
Start: 2020-07-27 | End: 2020-07-27

## 2020-07-27 RX ADMIN — CEFAZOLIN SODIUM/WATER 2 G: 2 G/20 ML SYRINGE (ML) INTRAVENOUS at 15:31:00

## 2020-07-27 NOTE — ANESTHESIA POSTPROCEDURE EVALUATION
[address] [address] HILLARY Grey Patient Status:  Hospital Outpatient Surgery   Age/Gender 78year old male MRN [id number]   Location [facility] Attending Angelia Carpenter DPM   Hosp Day # 0 PCP Otto Collet, MD Justus Curet

## 2020-07-27 NOTE — H&P
[facility] Hospitalist H&P       CC: No chief complaint on file.        PCP: Marjorie Cano MD    History of Present Illness:  Mr. Larry Matthews is a 77 yo male with PMH of HTN, HLD, seizure disorder, PAD, DM type II who presented for planned right foot wound irrigatio Take 25 mg by mouth daily. , Disp: , Rfl:   glimepiride 4 MG Oral Tab, Take 4 mg by mouth daily. , Disp: , Rfl:   BASAGLAR KWIKPEN 100 UNIT/ML Subcutaneous Solution Pen-injector, Inject 15 Units as directed nightly.  , Disp: , Rfl:   Insulin Lispro, 1 Unit ALB, AMYLASE, LIPASE, LDH in the last 168 hours. Invalid input(s): ALPHOS, TBIL, DBIL, TPROT    No results for input(s): TROP in the last 168 hours.     Additional Diagnostics: ECG: NSR    Radiology: Us Lower Ext Art (emma-segmental Pressures)-vasc Lab (c low !--------! Triphasic!-----! +--------------+--------+---------+-----+ ! R popliteal   !--------! Triphasic!-----! +--------------+--------+---------+-----+ ! R calf        !--------!---------!-----! +--------------+--------+---------+-----+ ! R PT 7/15/2020  ---------------------------------------------------------------------------- Limited Lower Extremity Arterial Duplex Study Patient: Nina Willis Date: Jul 15 2020 1:58PM :     1950 (69yrs)          Accession#: [id number] distal     !107cm/s  ! +------------------------------------+---------+ ! Right peroneal - distal             !68cm/s   ! +------------------------------------+---------+ ---------------------------------------------------------------------------- Study paige

## 2020-07-27 NOTE — ANESTHESIA PREPROCEDURE EVALUATION
PRE-OP EVALUATION    Patient Name: Katherine Grey    Pre-op Diagnosis: Diabetic ulcer of left midfoot associated with type 2 diabetes mellitus, with muscle involvement without evidence of necrosis ([facility] [M69.231, L97.892]  Diabetic polyneuropathy ass Fosinopril Sodium 20 MG Oral Tab, Take 20 mg by mouth daily. , Disp: , Rfl:   amLODIPine Besylate 10 MG Oral Tab, Take 10 mg by mouth daily. , Disp: , Rfl:   hydrALAzine HCl 25 MG Oral Tab, Take 25 mg by mouth daily.   , Disp: , Rfl:   glimepiride 4 MG Oral Results   Component Value Date     06/30/2020    K 3.6 06/30/2020     06/30/2020    CO2 25.0 06/30/2020    BUN 18 06/30/2020    CREATSERUM 0.94 06/30/2020     (H) 06/30/2020    CA 8.9 06/30/2020            Airway      Mallampati: II  Trudy

## 2020-07-27 NOTE — BRIEF OP NOTE
Pre-Operative Diagnosis: Diabetic ulcer of left midfoot associated with type 2 diabetes mellitus, with muscle involvement without evidence of necrosis ([facility] [G06.051, L97.425]  Diabetic polyneuropathy associated with type 2 diabetes mellitus [facility] [E11.42]

## 2020-07-27 NOTE — ANESTHESIA PROCEDURE NOTES
Airway  Date/Time: 7/27/2020 3:25 PM  Urgency: elective    Airway not difficult    General Information and Staff    Patient location during procedure: OR  Anesthesiologist: Angie Fish MD  Performed: anesthesiologist     Indications and Patient Co catheter inserted over wire.

## 2020-07-27 NOTE — OPERATIVE REPORT
Date of surgery: 7/27/2020     Preoperative Diagnosis:   1. Chronic nonhealing right foot diabetic ulcer  2. Diabetic neuropathy  3. PAD    Postoperative Diagnosis: Same     Procedure:   1.   Excisional wound debridement to level of muscle and tendon, 25

## 2020-08-10 ENCOUNTER — OFFICE VISIT (OUTPATIENT)
Dept: WOUND CARE | Facility: HOSPITAL | Age: 70
End: 2020-08-10
Attending: PODIATRIST
Payer: MEDICARE

## 2020-08-10 DIAGNOSIS — E11.621 TYPE 2 DIABETES MELLITUS WITH FOOT ULCER (HCC): ICD-10-CM

## 2020-08-10 DIAGNOSIS — L97.512 NON-PRESSURE CHRONIC ULCER OF OTHER PART OF RIGHT FOOT WITH FAT LAYER EXPOSED (HCC): ICD-10-CM

## 2020-08-10 DIAGNOSIS — E08.621 DIABETIC ULCER OF MIDFOOT ASSOCIATED WITH DIABETES MELLITUS DUE TO UNDERLYING CONDITION, WITH FAT LAYER EXPOSED, UNSPECIFIED LATERALITY (HCC): Primary | ICD-10-CM

## 2020-08-10 DIAGNOSIS — L97.402 DIABETIC ULCER OF MIDFOOT ASSOCIATED WITH DIABETES MELLITUS DUE TO UNDERLYING CONDITION, WITH FAT LAYER EXPOSED, UNSPECIFIED LATERALITY (HCC): Primary | ICD-10-CM

## 2020-08-10 DIAGNOSIS — I73.9 PERIPHERAL VASCULAR DISEASE, UNSPECIFIED (HCC): ICD-10-CM

## 2020-08-10 DIAGNOSIS — L97.509 TYPE 2 DIABETES MELLITUS WITH FOOT ULCER (HCC): ICD-10-CM

## 2020-08-10 LAB — GLUCOSE BLD-MCNC: 181 MG/DL (ref 70–99)

## 2020-08-10 PROCEDURE — 82962 GLUCOSE BLOOD TEST: CPT

## 2020-08-10 PROCEDURE — 97605 NEG PRS WND THER DME<=50SQCM: CPT

## 2020-08-10 NOTE — PROGRESS NOTES
Progress Note Details  Patient Name: Terrance Darling.   Patient Number: [id number]  Patient YOB: 1950  Date: 8/10/2020  Physician / Jeanmarie Course: Kailyn Aburto 19: Teresa 44    Chief Complaint  This information was obtained from the pa to:  Constitutional Symptoms (General Health)  Respiratory  Cardiovascular (Central/Peripheral)  Endocrine  Hematologic/Lymphatic  Allergic/Immunologic    Additional Information  Medication reconciliation completed at today's visit. : Yes        Objective right foot with necrosis of muscle  (Encounter Diagnosis) I73.9 - Peripheral vascular disease, unspecified        Plan    Wound Orders:  Wound #5 Right, Dorsal Foot    Topicals:  No anesthetic needed. Insensate.     Wound Cleansing & Dressings:  May shower

## 2020-08-17 ENCOUNTER — OFFICE VISIT (OUTPATIENT)
Dept: WOUND CARE | Facility: HOSPITAL | Age: 70
End: 2020-08-17
Attending: PODIATRIST
Payer: MEDICARE

## 2020-08-17 DIAGNOSIS — L97.402 DIABETIC ULCER OF MIDFOOT ASSOCIATED WITH DIABETES MELLITUS DUE TO UNDERLYING CONDITION, WITH FAT LAYER EXPOSED, UNSPECIFIED LATERALITY (HCC): Primary | ICD-10-CM

## 2020-08-17 DIAGNOSIS — L97.513 NON-PRESSURE CHRONIC ULCER OF OTHER PART OF RIGHT FOOT WITH NECROSIS OF MUSCLE (HCC): ICD-10-CM

## 2020-08-17 DIAGNOSIS — E08.621 DIABETIC ULCER OF MIDFOOT ASSOCIATED WITH DIABETES MELLITUS DUE TO UNDERLYING CONDITION, WITH FAT LAYER EXPOSED, UNSPECIFIED LATERALITY (HCC): Primary | ICD-10-CM

## 2020-08-17 DIAGNOSIS — I73.9 PERIPHERAL VASCULAR DISEASE, UNSPECIFIED (HCC): ICD-10-CM

## 2020-08-17 LAB — GLUCOSE BLD-MCNC: 88 MG/DL (ref 70–99)

## 2020-08-17 PROCEDURE — 82962 GLUCOSE BLOOD TEST: CPT

## 2020-08-17 PROCEDURE — 15275 SKIN SUB GRAFT FACE/NK/HF/G: CPT

## 2020-08-17 NOTE — PROGRESS NOTES
Progress Note Details  Patient Name: Catalina Sheikh   Patient Number: [id number]  Patient YOB: 1950  Date: 8/17/2020  Physician / Tanya Grider: Kailyn Wilkes 19: Teresa 44    Chief Complaint  This information was obtained from the pa to:  Constitutional Symptoms (General Health)  Respiratory  Cardiovascular (Central/Peripheral)  Endocrine  Hematologic/Lymphatic  Allergic/Immunologic    Additional Information  Medication reconciliation completed at today's visit. : Yes        Objective Diagnosis) I73.9 - Peripheral vascular disease, unspecified        Procedures    Wound #5  Wound #5 (Surgical Wound) is located on the right, dorsal foot.  A skin/subcutaneous tissue level excisional/surgical debridement with a total area debrided of 10.8 s Appointments:  Return Appointment in 1 week. Care summary  Discussed the Plan of Care at bedside with patient. The patient verbally acknowledges understanding of all instructions and all questions were answered. Reviewed and evaluated labs.   Reviewed h

## 2020-08-24 ENCOUNTER — OFFICE VISIT (OUTPATIENT)
Dept: WOUND CARE | Facility: HOSPITAL | Age: 70
End: 2020-08-24
Attending: PODIATRIST
Payer: MEDICARE

## 2020-08-24 DIAGNOSIS — L97.509 TYPE 2 DIABETES MELLITUS WITH FOOT ULCER (HCC): ICD-10-CM

## 2020-08-24 DIAGNOSIS — L97.513 NON-PRESSURE CHRONIC ULCER OF OTHER PART OF RIGHT FOOT WITH NECROSIS OF MUSCLE (HCC): ICD-10-CM

## 2020-08-24 DIAGNOSIS — L97.402 DIABETIC ULCER OF MIDFOOT ASSOCIATED WITH DIABETES MELLITUS DUE TO UNDERLYING CONDITION, WITH FAT LAYER EXPOSED, UNSPECIFIED LATERALITY (HCC): Primary | ICD-10-CM

## 2020-08-24 DIAGNOSIS — I73.9 PERIPHERAL VASCULAR DISEASE, UNSPECIFIED (HCC): ICD-10-CM

## 2020-08-24 DIAGNOSIS — E08.621 DIABETIC ULCER OF MIDFOOT ASSOCIATED WITH DIABETES MELLITUS DUE TO UNDERLYING CONDITION, WITH FAT LAYER EXPOSED, UNSPECIFIED LATERALITY (HCC): Primary | ICD-10-CM

## 2020-08-24 DIAGNOSIS — E11.621 TYPE 2 DIABETES MELLITUS WITH FOOT ULCER (HCC): ICD-10-CM

## 2020-08-24 LAB — GLUCOSE BLD-MCNC: 176 MG/DL (ref 70–99)

## 2020-08-24 PROCEDURE — 82962 GLUCOSE BLOOD TEST: CPT

## 2020-08-24 PROCEDURE — 15275 SKIN SUB GRAFT FACE/NK/HF/G: CPT

## 2020-08-24 NOTE — PROGRESS NOTES
Progress Note Details  Patient Name: Rachana Wu.   Patient Number: [id number]  Patient YOB: 1950  Date: 8/24/2020  Physician / Jennifer Brandon: Kailyn Hunt 19: Teresa 44    Chief Complaint  This information was obtained from the pa Symptoms  Patient denies complaints or symptoms related to:  Constitutional Symptoms (General Health)  Respiratory  Cardiovascular (Central/Peripheral)  Endocrine  Hematologic/Lymphatic  Allergic/Immunologic    Additional Information  Medication reconcilia debridement with a total area debrided of 8.91 sq cm was performed by Lori Rodriguez DPM. Subcutaneous and Tendon were removed along with devitalized tissue: biofilm and fibrin. The following instrument(s) were used: nippers.  Pain control was achieved using were answered. Reviewed and evaluated labs. Reviewed hospital records to include: - 6/24 Left foot wound debridement and Integra graft & VAC  7/27 Left foot wound debridement and Integra graft & VAC          Electronic Signature(s)  Signed By: Date:   emilia

## 2020-08-31 ENCOUNTER — OFFICE VISIT (OUTPATIENT)
Dept: WOUND CARE | Facility: HOSPITAL | Age: 70
End: 2020-08-31
Attending: PODIATRIST
Payer: MEDICARE

## 2020-08-31 DIAGNOSIS — E11.621 TYPE 2 DIABETES MELLITUS WITH FOOT ULCER (HCC): ICD-10-CM

## 2020-08-31 DIAGNOSIS — E08.621 DIABETIC ULCER OF MIDFOOT ASSOCIATED WITH DIABETES MELLITUS DUE TO UNDERLYING CONDITION, WITH FAT LAYER EXPOSED, UNSPECIFIED LATERALITY (HCC): Primary | ICD-10-CM

## 2020-08-31 DIAGNOSIS — L97.402 DIABETIC ULCER OF MIDFOOT ASSOCIATED WITH DIABETES MELLITUS DUE TO UNDERLYING CONDITION, WITH FAT LAYER EXPOSED, UNSPECIFIED LATERALITY (HCC): Primary | ICD-10-CM

## 2020-08-31 DIAGNOSIS — L97.513 NON-PRESSURE CHRONIC ULCER OF OTHER PART OF RIGHT FOOT WITH NECROSIS OF MUSCLE (HCC): ICD-10-CM

## 2020-08-31 DIAGNOSIS — L97.509 TYPE 2 DIABETES MELLITUS WITH FOOT ULCER (HCC): ICD-10-CM

## 2020-08-31 DIAGNOSIS — I73.9 PERIPHERAL VASCULAR DISEASE, UNSPECIFIED (HCC): ICD-10-CM

## 2020-08-31 LAB — GLUCOSE BLD-MCNC: 110 MG/DL (ref 70–99)

## 2020-08-31 PROCEDURE — 15275 SKIN SUB GRAFT FACE/NK/HF/G: CPT

## 2020-08-31 PROCEDURE — 82962 GLUCOSE BLOOD TEST: CPT

## 2020-08-31 NOTE — PROGRESS NOTES
Progress Note Details  Patient Name: Jennifer Cedeño.   Patient Number: [id number]  Patient YOB: 1950  Date: 8/31/2020  Physician / Nessa Ear: Kailyn Mckeon 19: Teresa 44    Chief Complaint  This information was obtained from the pa Systems (ROS)  This information was obtained from the patient, caregiver    Complaints and Symptoms  Patient denies complaints or symptoms related to:  Constitutional Symptoms (General Health)  Respiratory  Cardiovascular (Central/Peripheral)  Endocrine  H DPM with an application area of 6 sq cm. 0 sq cm of product was wasted due to entire product used. 6 sq cm of product was utilized and was not secured. Post application, a dressing was applied: adaptic, gauze.  A time out was conducted prior to the start of

## 2020-09-14 ENCOUNTER — OFFICE VISIT (OUTPATIENT)
Dept: WOUND CARE | Facility: HOSPITAL | Age: 70
End: 2020-09-14
Attending: PODIATRIST
Payer: MEDICARE

## 2020-09-14 DIAGNOSIS — E08.621 DIABETIC ULCER OF MIDFOOT ASSOCIATED WITH DIABETES MELLITUS DUE TO UNDERLYING CONDITION, WITH FAT LAYER EXPOSED, UNSPECIFIED LATERALITY (HCC): Primary | ICD-10-CM

## 2020-09-14 DIAGNOSIS — L97.509 TYPE 2 DIABETES MELLITUS WITH FOOT ULCER (HCC): ICD-10-CM

## 2020-09-14 DIAGNOSIS — L97.513 NON-PRESSURE CHRONIC ULCER OF OTHER PART OF RIGHT FOOT WITH NECROSIS OF MUSCLE (HCC): ICD-10-CM

## 2020-09-14 DIAGNOSIS — L97.402 DIABETIC ULCER OF MIDFOOT ASSOCIATED WITH DIABETES MELLITUS DUE TO UNDERLYING CONDITION, WITH FAT LAYER EXPOSED, UNSPECIFIED LATERALITY (HCC): Primary | ICD-10-CM

## 2020-09-14 DIAGNOSIS — E11.621 TYPE 2 DIABETES MELLITUS WITH FOOT ULCER (HCC): ICD-10-CM

## 2020-09-14 DIAGNOSIS — I73.9 PERIPHERAL VASCULAR DISEASE, UNSPECIFIED (HCC): ICD-10-CM

## 2020-09-14 LAB — GLUCOSE BLD-MCNC: 137 MG/DL (ref 70–99)

## 2020-09-14 PROCEDURE — 82962 GLUCOSE BLOOD TEST: CPT

## 2020-09-14 PROCEDURE — 15275 SKIN SUB GRAFT FACE/NK/HF/G: CPT

## 2020-09-14 NOTE — PROGRESS NOTES
Progress Note Details  Patient Name: Neno Lee.   Patient Number: [id number]  Patient YOB: 1950  Date: 9/14/2020  Physician / Steve Rios: Kailyn Pearson 19: Ileana Lazcano    Chief Complaint  This information was obtained from the pa was obtained from the patient, caregiver    Complaints and Symptoms  Patient denies complaints or symptoms related to:  Constitutional Symptoms (General Health)  Respiratory  Cardiovascular (Central/Peripheral)  Endocrine  Hematologic/Lymphatic  Allergic/I total area debrided of 7.5 sq cm was performed by Cal Velez DPM. Subcutaneous was removed along with devitalized tissue: biofilm, fibrin, and slough. The following instrument(s) were used: nippers. Pain control was achieved using N/A.  A time out was con VAC  7/27 Left foot wound debridement and Integra graft & VAC  Wound improving. No s/s of infection.           Electronic Signature(s)  Signed By: Missy Sims 09/14/2020 13:44:59

## 2020-09-21 ENCOUNTER — OFFICE VISIT (OUTPATIENT)
Dept: WOUND CARE | Facility: HOSPITAL | Age: 70
End: 2020-09-21
Attending: PODIATRIST
Payer: MEDICARE

## 2020-09-21 DIAGNOSIS — E11.621 TYPE 2 DIABETES MELLITUS WITH FOOT ULCER (HCC): ICD-10-CM

## 2020-09-21 DIAGNOSIS — E08.621 DIABETIC ULCER OF MIDFOOT ASSOCIATED WITH DIABETES MELLITUS DUE TO UNDERLYING CONDITION, WITH FAT LAYER EXPOSED, UNSPECIFIED LATERALITY (HCC): Primary | ICD-10-CM

## 2020-09-21 DIAGNOSIS — L97.513 NON-PRESSURE CHRONIC ULCER OF OTHER PART OF RIGHT FOOT WITH NECROSIS OF MUSCLE (HCC): ICD-10-CM

## 2020-09-21 DIAGNOSIS — L97.509 TYPE 2 DIABETES MELLITUS WITH FOOT ULCER (HCC): ICD-10-CM

## 2020-09-21 DIAGNOSIS — L97.402 DIABETIC ULCER OF MIDFOOT ASSOCIATED WITH DIABETES MELLITUS DUE TO UNDERLYING CONDITION, WITH FAT LAYER EXPOSED, UNSPECIFIED LATERALITY (HCC): Primary | ICD-10-CM

## 2020-09-21 DIAGNOSIS — I73.9 PERIPHERAL VASCULAR DISEASE, UNSPECIFIED (HCC): ICD-10-CM

## 2020-09-21 LAB — GLUCOSE BLD-MCNC: 191 MG/DL (ref 70–99)

## 2020-09-21 PROCEDURE — 82962 GLUCOSE BLOOD TEST: CPT

## 2020-09-21 PROCEDURE — 11042 DBRDMT SUBQ TIS 1ST 20SQCM/<: CPT

## 2020-09-21 NOTE — PROGRESS NOTES
Progress Note Details  Patient Name: Elysa Hamman.   Patient Number: [id number]  Patient YOB: 1950  Date: 9/21/2020  Physician / Alfredito Fragmin: Kailyn Augustine 19: Baltazar Layjose guadalupe    Chief Complaint  This information was obtained from the pa pain or drainage from wound. He is able to move is ankle and all 5 toes. General Notes:  Kerecis 3x7cm sample applied. Lot #[id number]. Exp. 11/2021.  Mariposa MICHAELNRNWOCN    Review of Systems (ROS)  This information was obtained from the patient, terrence Diagnosis) I73.9 - Peripheral vascular disease, unspecified        Procedures    Wound #5  Wound #5 (Surgical Wound) is located on the right, dorsal foot.  A skin/subcutaneous tissue/muscle/fascia level excisional/surgical debridement with a total area debr

## 2020-09-23 ENCOUNTER — CASE MANAGEMENT (OUTPATIENT)
Dept: CARE COORDINATION | Age: 70
End: 2020-09-23

## 2020-09-28 ENCOUNTER — OFFICE VISIT (OUTPATIENT)
Dept: WOUND CARE | Facility: HOSPITAL | Age: 70
End: 2020-09-28
Attending: PODIATRIST
Payer: MEDICARE

## 2020-09-28 DIAGNOSIS — L97.509 TYPE 2 DIABETES MELLITUS WITH FOOT ULCER (HCC): Primary | ICD-10-CM

## 2020-09-28 DIAGNOSIS — E11.621 TYPE 2 DIABETES MELLITUS WITH FOOT ULCER (HCC): Primary | ICD-10-CM

## 2020-09-28 DIAGNOSIS — L97.513 NON-PRESSURE CHRONIC ULCER OF OTHER PART OF RIGHT FOOT WITH NECROSIS OF MUSCLE (HCC): ICD-10-CM

## 2020-09-28 DIAGNOSIS — I73.9 PERIPHERAL VASCULAR DISEASE, UNSPECIFIED (HCC): ICD-10-CM

## 2020-09-28 LAB — GLUCOSE BLD-MCNC: 218 MG/DL (ref 70–99)

## 2020-09-28 PROCEDURE — 99213 OFFICE O/P EST LOW 20 MIN: CPT

## 2020-09-28 PROCEDURE — 82962 GLUCOSE BLOOD TEST: CPT

## 2020-10-05 ENCOUNTER — OFFICE VISIT (OUTPATIENT)
Dept: WOUND CARE | Facility: HOSPITAL | Age: 70
End: 2020-10-05
Attending: PODIATRIST
Payer: MEDICARE

## 2020-10-05 DIAGNOSIS — E11.621 TYPE 2 DIABETES MELLITUS WITH FOOT ULCER (HCC): ICD-10-CM

## 2020-10-05 DIAGNOSIS — L97.509 TYPE 2 DIABETES MELLITUS WITH FOOT ULCER (HCC): ICD-10-CM

## 2020-10-05 DIAGNOSIS — L97.513 NON-PRESSURE CHRONIC ULCER OF OTHER PART OF RIGHT FOOT WITH NECROSIS OF MUSCLE (HCC): ICD-10-CM

## 2020-10-05 DIAGNOSIS — L97.509 TYPE 2 DIABETES MELLITUS WITH FOOT ULCER, WITH LONG-TERM CURRENT USE OF INSULIN (HCC): Primary | ICD-10-CM

## 2020-10-05 DIAGNOSIS — I73.9 PERIPHERAL VASCULAR DISEASE, UNSPECIFIED (HCC): ICD-10-CM

## 2020-10-05 DIAGNOSIS — Z79.4 TYPE 2 DIABETES MELLITUS WITH FOOT ULCER, WITH LONG-TERM CURRENT USE OF INSULIN (HCC): Primary | ICD-10-CM

## 2020-10-05 DIAGNOSIS — E11.621 TYPE 2 DIABETES MELLITUS WITH FOOT ULCER, WITH LONG-TERM CURRENT USE OF INSULIN (HCC): Primary | ICD-10-CM

## 2020-10-05 PROCEDURE — 11045 DBRDMT SUBQ TISS EACH ADDL: CPT

## 2020-10-05 PROCEDURE — 82962 GLUCOSE BLOOD TEST: CPT

## 2020-10-05 PROCEDURE — 11043 DBRDMT MUSC&/FSCA 1ST 20/<: CPT

## 2020-10-05 NOTE — PROGRESS NOTES
Progress Note Details  Patient Name: Jennifer Cedeño.   Patient Number: [id number]  Patient YOB: 1950  Date: 10/5/2020  Physician / Nessa Ear: Kailyn Mckeon 19: Donemagy Childers    Chief Complaint  This information was obtained from the pa He denies any fever or chills, denies much pain or drainage from wound. He is able to move is ankle and all 5 toes. General Notes:  Kerecis to wound bed.  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

## 2020-10-12 ENCOUNTER — OFFICE VISIT (OUTPATIENT)
Dept: WOUND CARE | Facility: HOSPITAL | Age: 70
End: 2020-10-12
Attending: PODIATRIST
Payer: MEDICARE

## 2020-10-12 DIAGNOSIS — Z79.4 TYPE 2 DIABETES MELLITUS WITH FOOT ULCER, WITH LONG-TERM CURRENT USE OF INSULIN (HCC): Primary | ICD-10-CM

## 2020-10-12 DIAGNOSIS — L97.513 NON-PRESSURE CHRONIC ULCER OF OTHER PART OF RIGHT FOOT WITH NECROSIS OF MUSCLE (HCC): ICD-10-CM

## 2020-10-12 DIAGNOSIS — I73.9 PERIPHERAL VASCULAR DISEASE, UNSPECIFIED (HCC): ICD-10-CM

## 2020-10-12 DIAGNOSIS — L97.509 TYPE 2 DIABETES MELLITUS WITH FOOT ULCER, WITH LONG-TERM CURRENT USE OF INSULIN (HCC): Primary | ICD-10-CM

## 2020-10-12 DIAGNOSIS — E11.621 TYPE 2 DIABETES MELLITUS WITH FOOT ULCER, WITH LONG-TERM CURRENT USE OF INSULIN (HCC): Primary | ICD-10-CM

## 2020-10-12 PROCEDURE — 82962 GLUCOSE BLOOD TEST: CPT

## 2020-10-12 PROCEDURE — 17250 CHEM CAUT OF GRANLTJ TISSUE: CPT

## 2020-10-12 PROCEDURE — 99214 OFFICE O/P EST MOD 30 MIN: CPT

## 2020-10-12 NOTE — PROGRESS NOTES
Progress Note Details  Patient Name: Jasper Alvarez.   Patient Number: [id number]  Patient YOB: 1950  Date: 10/12/2020  Physician / Alba Collins: Kailyn Fernando 19: Erica Lopez    Chief Complaint  This information was obtained from the p aquacel. He states he does have sensation to the foot, ankle, and leg. He denies any fever or chills, denies much pain or drainage from wound. He is able to move is ankle and all 5 toes.     Review of Systems (ROS)  This information was obtained from the p I73.9 - Peripheral vascular disease, unspecified        Plan    Wound Orders:  Wound #5 Right, Dorsal Foot    Topicals:  No anesthetic needed. Insensate. Wound Cleansing & Dressings:  May shower with protection.   Hydrofera Ready  ABD pad  Georgena Bame

## 2020-10-19 ENCOUNTER — OFFICE VISIT (OUTPATIENT)
Dept: WOUND CARE | Facility: HOSPITAL | Age: 70
End: 2020-10-19
Attending: PODIATRIST
Payer: MEDICARE

## 2020-10-19 DIAGNOSIS — L97.513 NON-PRESSURE CHRONIC ULCER OF OTHER PART OF RIGHT FOOT WITH NECROSIS OF MUSCLE (HCC): ICD-10-CM

## 2020-10-19 DIAGNOSIS — Z79.4 TYPE 2 DIABETES MELLITUS WITH FOOT ULCER, WITH LONG-TERM CURRENT USE OF INSULIN (HCC): Primary | ICD-10-CM

## 2020-10-19 DIAGNOSIS — L97.509 TYPE 2 DIABETES MELLITUS WITH FOOT ULCER, WITH LONG-TERM CURRENT USE OF INSULIN (HCC): Primary | ICD-10-CM

## 2020-10-19 DIAGNOSIS — I73.9 PERIPHERAL VASCULAR DISEASE, UNSPECIFIED (HCC): ICD-10-CM

## 2020-10-19 DIAGNOSIS — E11.621 TYPE 2 DIABETES MELLITUS WITH FOOT ULCER, WITH LONG-TERM CURRENT USE OF INSULIN (HCC): Primary | ICD-10-CM

## 2020-10-19 PROCEDURE — 17250 CHEM CAUT OF GRANLTJ TISSUE: CPT

## 2020-10-19 PROCEDURE — 82962 GLUCOSE BLOOD TEST: CPT

## 2020-10-19 NOTE — PROGRESS NOTES
Progress Note Details  Patient Name: Tay Lyons.   Patient Number: [id number]  Patient YOB: 1950  Date: 10/19/2020  Physician / Sarahy Grad: Kailyn Calix 19: Cricket July    Chief Complaint  This information was obtained from the p since 2007. X-ray of foot showed arthritis. He was having wound care with aquacel. He states he does have sensation to the foot, ankle, and leg. He denies any fever or chills, denies much pain or drainage from wound.  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

## 2020-11-02 ENCOUNTER — OFFICE VISIT (OUTPATIENT)
Dept: WOUND CARE | Facility: HOSPITAL | Age: 70
End: 2020-11-02
Attending: PODIATRIST
Payer: MEDICARE

## 2020-11-02 DIAGNOSIS — L97.509 TYPE 2 DIABETES MELLITUS WITH FOOT ULCER, WITH LONG-TERM CURRENT USE OF INSULIN (HCC): Primary | ICD-10-CM

## 2020-11-02 DIAGNOSIS — I73.9 PERIPHERAL VASCULAR DISEASE, UNSPECIFIED (HCC): ICD-10-CM

## 2020-11-02 DIAGNOSIS — E11.621 TYPE 2 DIABETES MELLITUS WITH FOOT ULCER, WITH LONG-TERM CURRENT USE OF INSULIN (HCC): Primary | ICD-10-CM

## 2020-11-02 DIAGNOSIS — L97.513 NON-PRESSURE CHRONIC ULCER OF OTHER PART OF RIGHT FOOT WITH NECROSIS OF MUSCLE (HCC): ICD-10-CM

## 2020-11-02 DIAGNOSIS — Z79.4 TYPE 2 DIABETES MELLITUS WITH FOOT ULCER, WITH LONG-TERM CURRENT USE OF INSULIN (HCC): Primary | ICD-10-CM

## 2020-11-02 PROCEDURE — 15275 SKIN SUB GRAFT FACE/NK/HF/G: CPT

## 2020-11-02 PROCEDURE — 82962 GLUCOSE BLOOD TEST: CPT

## 2020-11-02 NOTE — PROGRESS NOTES
Progress Note Details  Patient Name: Hamilton Houston.   Patient Number: [id number]  Patient YOB: 1950  Date: 11/2/2020  Physician / Millie Segovia: Kailyn Crain 19: Bryant Dance    Chief Complaint  This information was obtained from the pa on the foot. Pt is legally blind from diabetic retinopathy since 2007. X-ray of foot showed arthritis. He was having wound care with aquacel. He states he does have sensation to the foot, ankle, and leg.  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

## 2020-11-16 ENCOUNTER — OFFICE VISIT (OUTPATIENT)
Dept: WOUND CARE | Facility: HOSPITAL | Age: 70
End: 2020-11-16
Attending: PODIATRIST
Payer: MEDICARE

## 2020-11-16 DIAGNOSIS — E11.621 TYPE 2 DIABETES MELLITUS WITH FOOT ULCER, WITH LONG-TERM CURRENT USE OF INSULIN (HCC): Primary | ICD-10-CM

## 2020-11-16 DIAGNOSIS — B35.1 TINEA UNGUIUM: ICD-10-CM

## 2020-11-16 DIAGNOSIS — I73.9 PERIPHERAL VASCULAR DISEASE, UNSPECIFIED (HCC): ICD-10-CM

## 2020-11-16 DIAGNOSIS — L97.513 NON-PRESSURE CHRONIC ULCER OF OTHER PART OF RIGHT FOOT WITH NECROSIS OF MUSCLE (HCC): ICD-10-CM

## 2020-11-16 DIAGNOSIS — L97.509 TYPE 2 DIABETES MELLITUS WITH FOOT ULCER, WITH LONG-TERM CURRENT USE OF INSULIN (HCC): Primary | ICD-10-CM

## 2020-11-16 DIAGNOSIS — Z79.4 TYPE 2 DIABETES MELLITUS WITH FOOT ULCER, WITH LONG-TERM CURRENT USE OF INSULIN (HCC): Primary | ICD-10-CM

## 2020-11-16 PROCEDURE — 82962 GLUCOSE BLOOD TEST: CPT

## 2020-11-16 PROCEDURE — 99214 OFFICE O/P EST MOD 30 MIN: CPT

## 2020-11-16 NOTE — PROGRESS NOTES
Progress Note Details  Patient Name: Vane Albert.   Patient Number: [id number]  Patient YOB: 1950  Date: 11/16/2020  Physician / Judith : Sindhu Gonzalez: Philippe Maxwell    Chief Complaint  This information was obtained from the p Parth around 5/6/20. Since then he has had an open wound on the foot. Pt is legally blind from diabetic retinopathy since 2007. X-ray of foot showed arthritis. He was having wound care with aquacel.   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

## 2020-12-04 ENCOUNTER — CASE MANAGEMENT (OUTPATIENT)
Dept: CARE COORDINATION | Age: 70
End: 2020-12-04

## 2021-02-04 ENCOUNTER — CASE MANAGEMENT (OUTPATIENT)
Dept: CARE COORDINATION | Age: 71
End: 2021-02-04

## 2021-03-08 ENCOUNTER — APPOINTMENT (OUTPATIENT)
Dept: WOUND CARE | Facility: HOSPITAL | Age: 71
End: 2021-03-08
Attending: PODIATRIST
Payer: MEDICARE

## 2021-03-15 ENCOUNTER — OFFICE VISIT (OUTPATIENT)
Dept: WOUND CARE | Facility: HOSPITAL | Age: 71
End: 2021-03-15
Attending: PODIATRIST
Payer: MEDICARE

## 2021-03-15 DIAGNOSIS — Z79.4 TYPE 2 DIABETES MELLITUS WITH FOOT ULCER, WITH LONG-TERM CURRENT USE OF INSULIN (HCC): Primary | ICD-10-CM

## 2021-03-15 DIAGNOSIS — L97.509 TYPE 2 DIABETES MELLITUS WITH FOOT ULCER, WITH LONG-TERM CURRENT USE OF INSULIN (HCC): Primary | ICD-10-CM

## 2021-03-15 DIAGNOSIS — E11.621 TYPE 2 DIABETES MELLITUS WITH FOOT ULCER, WITH LONG-TERM CURRENT USE OF INSULIN (HCC): Primary | ICD-10-CM

## 2021-11-23 DIAGNOSIS — Z11.59 SCREENING EXAMINATION FOR OTHER ARTHROPOD-BORNE VIRAL DISEASES: Primary | ICD-10-CM

## 2021-12-01 ENCOUNTER — LAB SERVICES (OUTPATIENT)
Dept: LAB | Age: 71
End: 2021-12-01

## 2021-12-01 DIAGNOSIS — Z11.59 SCREENING EXAMINATION FOR OTHER ARTHROPOD-BORNE VIRAL DISEASES: ICD-10-CM

## 2021-12-01 LAB
SARS-COV-2 RNA RESP QL NAA+PROBE: NOT DETECTED
SERVICE CMNT-IMP: NORMAL
SERVICE CMNT-IMP: NORMAL

## 2021-12-01 PROCEDURE — U0005 INFEC AGEN DETEC AMPLI PROBE: HCPCS | Performed by: CLINICAL MEDICAL LABORATORY

## 2021-12-01 PROCEDURE — U0003 INFECTIOUS AGENT DETECTION BY NUCLEIC ACID (DNA OR RNA); SEVERE ACUTE RESPIRATORY SYNDROME CORONAVIRUS 2 (SARS-COV-2) (CORONAVIRUS DISEASE [COVID-19]), AMPLIFIED PROBE TECHNIQUE, MAKING USE OF HIGH THROUGHPUT TECHNOLOGIES AS DESCRIBED BY CMS-2020-01-R: HCPCS | Performed by: CLINICAL MEDICAL LABORATORY

## 2022-08-28 ENCOUNTER — APPOINTMENT (OUTPATIENT)
Dept: CT IMAGING | Age: 72
DRG: 603 | End: 2022-08-28
Attending: EMERGENCY MEDICINE

## 2022-08-28 ENCOUNTER — HOSPITAL ENCOUNTER (INPATIENT)
Age: 72
LOS: 2 days | Discharge: HOME | DRG: 603 | End: 2022-08-31
Attending: EMERGENCY MEDICINE | Admitting: INTERNAL MEDICINE

## 2022-08-28 DIAGNOSIS — L02.01 FACIAL ABSCESS: Primary | ICD-10-CM

## 2022-08-28 LAB
ANION GAP SERPL CALC-SCNC: 11 MMOL/L (ref 7–19)
APTT PPP: 27 SEC (ref 22–30)
BASOPHILS # BLD: 0 K/MCL (ref 0–0.3)
BASOPHILS NFR BLD: 0 %
BUN SERPL-MCNC: 20 MG/DL (ref 6–20)
BUN/CREAT SERPL: 14 (ref 7–25)
CALCIUM SERPL-MCNC: 9.4 MG/DL (ref 8.4–10.2)
CHLORIDE SERPL-SCNC: 103 MMOL/L (ref 97–110)
CO2 SERPL-SCNC: 28 MMOL/L (ref 21–32)
CREAT SERPL-MCNC: 1.43 MG/DL (ref 0.67–1.17)
DEPRECATED RDW RBC: 45.1 FL (ref 39–50)
EOSINOPHIL # BLD: 0.3 K/MCL (ref 0–0.5)
EOSINOPHIL NFR BLD: 4 %
ERYTHROCYTE [DISTWIDTH] IN BLOOD: 14.2 % (ref 11–15)
FASTING DURATION TIME PATIENT: ABNORMAL H
GFR SERPLBLD BASED ON 1.73 SQ M-ARVRAT: 52 ML/MIN
GLUCOSE BLDC GLUCOMTR-MCNC: 151 MG/DL (ref 70–99)
GLUCOSE BLDC GLUCOMTR-MCNC: 188 MG/DL (ref 70–99)
GLUCOSE SERPL-MCNC: 164 MG/DL (ref 70–99)
HCT VFR BLD CALC: 50.2 % (ref 39–51)
HGB BLD-MCNC: 16.4 G/DL (ref 13–17)
IMM GRANULOCYTES # BLD AUTO: 0 K/MCL (ref 0–0.2)
IMM GRANULOCYTES # BLD: 0 %
INR PPP: 1
LACTATE BLDV-SCNC: 1.1 MMOL/L (ref 0–2)
LYMPHOCYTES # BLD: 0.7 K/MCL (ref 1–4)
LYMPHOCYTES NFR BLD: 10 %
MCH RBC QN AUTO: 28.7 PG (ref 26–34)
MCHC RBC AUTO-ENTMCNC: 32.7 G/DL (ref 32–36.5)
MCV RBC AUTO: 87.8 FL (ref 78–100)
MONOCYTES # BLD: 0.6 K/MCL (ref 0.3–0.9)
MONOCYTES NFR BLD: 8 %
NEUTROPHILS # BLD: 5.8 K/MCL (ref 1.8–7.7)
NEUTROPHILS NFR BLD: 78 %
NRBC BLD MANUAL-RTO: 0 /100 WBC
PLATELET # BLD AUTO: 273 K/MCL (ref 140–450)
POTASSIUM SERPL-SCNC: 4.1 MMOL/L (ref 3.4–5.1)
PROTHROMBIN TIME: 10.3 SEC (ref 9.7–11.8)
RAINBOW EXTRA TUBES HOLD SPECIMEN: NORMAL
RBC # BLD: 5.72 MIL/MCL (ref 4.5–5.9)
SODIUM SERPL-SCNC: 138 MMOL/L (ref 135–145)
WBC # BLD: 7.5 K/MCL (ref 4.2–11)

## 2022-08-28 PROCEDURE — 96375 TX/PRO/DX INJ NEW DRUG ADDON: CPT

## 2022-08-28 PROCEDURE — 10002807 HB RX 258: Performed by: EMERGENCY MEDICINE

## 2022-08-28 PROCEDURE — 10004651 HB RX, NO CHARGE ITEM: Performed by: EMERGENCY MEDICINE

## 2022-08-28 PROCEDURE — 10002800 HB RX 250 W HCPCS: Performed by: INTERNAL MEDICINE

## 2022-08-28 PROCEDURE — 82962 GLUCOSE BLOOD TEST: CPT

## 2022-08-28 PROCEDURE — 96374 THER/PROPH/DIAG INJ IV PUSH: CPT

## 2022-08-28 PROCEDURE — 85025 COMPLETE CBC W/AUTO DIFF WBC: CPT | Performed by: EMERGENCY MEDICINE

## 2022-08-28 PROCEDURE — 10002805 HB CONTRAST AGENT: Performed by: EMERGENCY MEDICINE

## 2022-08-28 PROCEDURE — 70487 CT MAXILLOFACIAL W/DYE: CPT

## 2022-08-28 PROCEDURE — U0005 INFEC AGEN DETEC AMPLI PROBE: HCPCS | Performed by: EMERGENCY MEDICINE

## 2022-08-28 PROCEDURE — 80048 BASIC METABOLIC PNL TOTAL CA: CPT | Performed by: EMERGENCY MEDICINE

## 2022-08-28 PROCEDURE — 10061 I&D ABSCESS COMP/MULTIPLE: CPT | Performed by: EMERGENCY MEDICINE

## 2022-08-28 PROCEDURE — 83605 ASSAY OF LACTIC ACID: CPT | Performed by: EMERGENCY MEDICINE

## 2022-08-28 PROCEDURE — 85730 THROMBOPLASTIN TIME PARTIAL: CPT | Performed by: EMERGENCY MEDICINE

## 2022-08-28 PROCEDURE — 0J910ZZ DRAINAGE OF FACE SUBCUTANEOUS TISSUE AND FASCIA, OPEN APPROACH: ICD-10-PCS | Performed by: EMERGENCY MEDICINE

## 2022-08-28 PROCEDURE — 10002803 HB RX 637: Performed by: INTERNAL MEDICINE

## 2022-08-28 PROCEDURE — 85610 PROTHROMBIN TIME: CPT | Performed by: EMERGENCY MEDICINE

## 2022-08-28 PROCEDURE — 10002801 HB RX 250 W/O HCPCS: Performed by: EMERGENCY MEDICINE

## 2022-08-28 PROCEDURE — 10060 I&D ABSCESS SIMPLE/SINGLE: CPT

## 2022-08-28 PROCEDURE — 36415 COLL VENOUS BLD VENIPUNCTURE: CPT

## 2022-08-28 PROCEDURE — 87040 BLOOD CULTURE FOR BACTERIA: CPT | Performed by: EMERGENCY MEDICINE

## 2022-08-28 PROCEDURE — 99284 EMERGENCY DEPT VISIT MOD MDM: CPT

## 2022-08-28 PROCEDURE — G0378 HOSPITAL OBSERVATION PER HR: HCPCS

## 2022-08-28 PROCEDURE — 10002800 HB RX 250 W HCPCS: Performed by: EMERGENCY MEDICINE

## 2022-08-28 RX ORDER — FAMOTIDINE 20 MG/1
20 TABLET, FILM COATED ORAL DAILY
Status: DISCONTINUED | OUTPATIENT
Start: 2022-08-29 | End: 2022-08-31 | Stop reason: HOSPADM

## 2022-08-28 RX ORDER — HYDRALAZINE HYDROCHLORIDE 25 MG/1
25 TABLET, FILM COATED ORAL DAILY
Status: DISCONTINUED | OUTPATIENT
Start: 2022-08-28 | End: 2022-08-31 | Stop reason: HOSPADM

## 2022-08-28 RX ORDER — ATORVASTATIN CALCIUM 20 MG/1
20 TABLET, FILM COATED ORAL NIGHTLY
Status: DISCONTINUED | OUTPATIENT
Start: 2022-08-28 | End: 2022-08-31 | Stop reason: HOSPADM

## 2022-08-28 RX ORDER — NICOTINE POLACRILEX 4 MG
15 LOZENGE BUCCAL PRN
Status: DISCONTINUED | OUTPATIENT
Start: 2022-08-28 | End: 2022-08-31 | Stop reason: HOSPADM

## 2022-08-28 RX ORDER — NICOTINE POLACRILEX 4 MG
30 LOZENGE BUCCAL PRN
Status: DISCONTINUED | OUTPATIENT
Start: 2022-08-28 | End: 2022-08-31 | Stop reason: HOSPADM

## 2022-08-28 RX ORDER — DEXTROSE MONOHYDRATE 25 G/50ML
12.5 INJECTION, SOLUTION INTRAVENOUS PRN
Status: DISCONTINUED | OUTPATIENT
Start: 2022-08-28 | End: 2022-08-31 | Stop reason: HOSPADM

## 2022-08-28 RX ORDER — 0.9 % SODIUM CHLORIDE 0.9 %
2 VIAL (ML) INJECTION EVERY 12 HOURS SCHEDULED
Status: DISCONTINUED | OUTPATIENT
Start: 2022-08-28 | End: 2022-08-31 | Stop reason: HOSPADM

## 2022-08-28 RX ORDER — METOPROLOL SUCCINATE 25 MG/1
25 TABLET, EXTENDED RELEASE ORAL DAILY
Status: DISCONTINUED | OUTPATIENT
Start: 2022-08-29 | End: 2022-08-31 | Stop reason: HOSPADM

## 2022-08-28 RX ORDER — AMLODIPINE BESYLATE 10 MG/1
10 TABLET ORAL DAILY
Status: DISCONTINUED | OUTPATIENT
Start: 2022-08-29 | End: 2022-08-31 | Stop reason: HOSPADM

## 2022-08-28 RX ORDER — INSULIN LISPRO 100 [IU]/ML
6 INJECTION, SOLUTION INTRAVENOUS; SUBCUTANEOUS
Status: DISCONTINUED | OUTPATIENT
Start: 2022-08-29 | End: 2022-08-31 | Stop reason: HOSPADM

## 2022-08-28 RX ORDER — INSULIN GLARGINE 100 [IU]/ML
28 INJECTION, SOLUTION SUBCUTANEOUS NIGHTLY
Status: DISCONTINUED | OUTPATIENT
Start: 2022-08-28 | End: 2022-08-29

## 2022-08-28 RX ORDER — CLOPIDOGREL BISULFATE 75 MG/1
75 TABLET ORAL DAILY
Status: DISCONTINUED | OUTPATIENT
Start: 2022-08-29 | End: 2022-08-31 | Stop reason: HOSPADM

## 2022-08-28 RX ORDER — LISINOPRIL 20 MG/1
20 TABLET ORAL DAILY
Status: DISCONTINUED | OUTPATIENT
Start: 2022-08-29 | End: 2022-08-31 | Stop reason: HOSPADM

## 2022-08-28 RX ORDER — METOPROLOL SUCCINATE 25 MG/1
25 TABLET, EXTENDED RELEASE ORAL DAILY
COMMUNITY

## 2022-08-28 RX ORDER — DEXTROSE MONOHYDRATE 25 G/50ML
25 INJECTION, SOLUTION INTRAVENOUS PRN
Status: DISCONTINUED | OUTPATIENT
Start: 2022-08-28 | End: 2022-08-31 | Stop reason: HOSPADM

## 2022-08-28 RX ADMIN — INSULIN GLARGINE 28 UNITS: 100 INJECTION, SOLUTION SUBCUTANEOUS at 23:11

## 2022-08-28 RX ADMIN — HYDRALAZINE HYDROCHLORIDE 25 MG: 50 TABLET, FILM COATED ORAL at 21:55

## 2022-08-28 RX ADMIN — VANCOMYCIN HYDROCHLORIDE 1250 MG: 10 INJECTION, POWDER, LYOPHILIZED, FOR SOLUTION INTRAVENOUS at 14:03

## 2022-08-28 RX ADMIN — SODIUM CHLORIDE, PRESERVATIVE FREE 2 ML: 5 INJECTION INTRAVENOUS at 14:05

## 2022-08-28 RX ADMIN — ATORVASTATIN CALCIUM 20 MG: 20 TABLET, FILM COATED ORAL at 21:55

## 2022-08-28 RX ADMIN — SODIUM CHLORIDE, PRESERVATIVE FREE 2 ML: 5 INJECTION INTRAVENOUS at 21:54

## 2022-08-28 RX ADMIN — IOHEXOL 80 ML: 350 INJECTION, SOLUTION INTRAVENOUS at 13:39

## 2022-08-28 ASSESSMENT — COGNITIVE AND FUNCTIONAL STATUS - GENERAL
BECAUSE OF A PHYSICAL, MENTAL, OR EMOTIONAL CONDITION, DO YOU HAVE DIFFICULTY DOING ERRANDS ALONE: NO
DO YOU HAVE SERIOUS DIFFICULTY WALKING OR CLIMBING STAIRS: NO
BECAUSE OF A PHYSICAL, MENTAL, OR EMOTIONAL CONDITION, DO YOU HAVE SERIOUS DIFFICULTY CONCENTRATING, REMEMBERING OR MAKING DECISIONS: NO
DO YOU HAVE DIFFICULTY DRESSING OR BATHING: NO

## 2022-08-28 ASSESSMENT — LIFESTYLE VARIABLES
HOW MANY STANDARD DRINKS CONTAINING ALCOHOL DO YOU HAVE ON A TYPICAL DAY: 0,1 OR 2
RECENT DECLINE IN ADLS: NO
ADL BEFORE ADMISSION: INDEPENDENT
HOW OFTEN DO YOU HAVE 6 OR MORE DRINKS ON ONE OCCASION: NEVER
ARE YOU DEAF OR DO YOU HAVE SERIOUS DIFFICULTY  HEARING: NO
ARE YOU BLIND OR DO YOU HAVE SERIOUS DIFFICULTY SEEING, EVEN WHEN WEARING GLASSES: NO
HISTORY OF PROBLEMS WHEN YOU STOP DRINKING ALCOHOL: NO
ADL NEEDS ASSIST: NO
ALCOHOL_USE_STATUS: NO OR LOW RISK WITH VALIDATED TOOL
AUDIT-C TOTAL SCORE: 0
IS PATIENT ABLE TO COMPLETE ASSESSMENT AT THIS TIME: NO (T)
CHRONIC/CANCER PAIN PRESENT: NO
LAST DRINK YOU HAD: DENIES INTAKE
SHORT OF BREATH OR FATIGUE WITH ADLS: NO
HOW OFTEN DO YOU HAVE A DRINK CONTAINING ALCOHOL: NEVER
SMOKING_YEARS: NO

## 2022-08-28 ASSESSMENT — COLUMBIA-SUICIDE SEVERITY RATING SCALE - C-SSRS
IS THE PATIENT ABLE TO COMPLETE C-SSRS: YES
2. HAVE YOU ACTUALLY HAD ANY THOUGHTS OF KILLING YOURSELF?: NO
1. WITHIN THE PAST MONTH, HAVE YOU WISHED YOU WERE DEAD OR WISHED YOU COULD GO TO SLEEP AND NOT WAKE UP?: NO
6. HAVE YOU EVER DONE ANYTHING, STARTED TO DO ANYTHING, OR PREPARED TO DO ANYTHING TO END YOUR LIFE?: NO

## 2022-08-28 ASSESSMENT — PATIENT HEALTH QUESTIONNAIRE - PHQ9
IS PATIENT ABLE TO COMPLETE PHQ2 OR PHQ9: YES
SUM OF ALL RESPONSES TO PHQ9 QUESTIONS 1 AND 2: 0
1. LITTLE INTEREST OR PLEASURE IN DOING THINGS: NOT AT ALL
SUM OF ALL RESPONSES TO PHQ9 QUESTIONS 1 AND 2: 0
2. FEELING DOWN, DEPRESSED OR HOPELESS: NOT AT ALL
CLINICAL INTERPRETATION OF PHQ2 SCORE: NO FURTHER SCREENING NEEDED

## 2022-08-28 ASSESSMENT — ACTIVITIES OF DAILY LIVING (ADL)
SENSORY_SUPPORT_DEVICES: NONE;EYEGLASSES
ADL_SCORE: 12

## 2022-08-28 ASSESSMENT — PAIN SCALES - GENERAL
PAINLEVEL_OUTOF10: 0
PAINLEVEL_OUTOF10: 0

## 2022-08-29 PROBLEM — K21.9 GASTROESOPHAGEAL REFLUX DISEASE: Status: ACTIVE | Noted: 2022-08-29

## 2022-08-29 PROBLEM — E78.5 HYPERLIPIDEMIA: Status: ACTIVE | Noted: 2020-05-08

## 2022-08-29 PROBLEM — E11.21 DIABETIC RENAL DISEASE (CMD): Status: ACTIVE | Noted: 2022-08-29

## 2022-08-29 PROBLEM — E03.9 HYPOTHYROIDISM: Status: ACTIVE | Noted: 2022-08-29

## 2022-08-29 PROBLEM — I73.9 PAD (PERIPHERAL ARTERY DISEASE) (CMD): Status: ACTIVE | Noted: 2020-05-08

## 2022-08-29 PROBLEM — E11.65 DIABETES MELLITUS WITH HYPERGLYCEMIA  (CMD): Status: ACTIVE | Noted: 2022-08-29

## 2022-08-29 PROBLEM — E78.5 DYSLIPIDEMIA: Status: ACTIVE | Noted: 2022-08-29

## 2022-08-29 PROBLEM — I10 BENIGN ESSENTIAL HYPERTENSION: Status: ACTIVE | Noted: 2020-05-08

## 2022-08-29 LAB
ALBUMIN SERPL-MCNC: 3.5 G/DL (ref 3.6–5.1)
ALBUMIN/GLOB SERPL: 1.1 {RATIO} (ref 1–2.4)
ALP SERPL-CCNC: 80 UNITS/L (ref 45–117)
ALT SERPL-CCNC: 21 UNITS/L
ANION GAP SERPL CALC-SCNC: 10 MMOL/L (ref 7–19)
AST SERPL-CCNC: 18 UNITS/L
BASOPHILS # BLD: 0 K/MCL (ref 0–0.3)
BASOPHILS NFR BLD: 1 %
BILIRUB SERPL-MCNC: 1 MG/DL (ref 0.2–1)
BUN SERPL-MCNC: 20 MG/DL (ref 6–20)
BUN/CREAT SERPL: 16 (ref 7–25)
CALCIUM SERPL-MCNC: 9.3 MG/DL (ref 8.4–10.2)
CHLORIDE SERPL-SCNC: 107 MMOL/L (ref 97–110)
CO2 SERPL-SCNC: 27 MMOL/L (ref 21–32)
CREAT SERPL-MCNC: 1.29 MG/DL (ref 0.67–1.17)
DEPRECATED RDW RBC: 45.7 FL (ref 39–50)
EOSINOPHIL # BLD: 0 K/MCL (ref 0–0.5)
EOSINOPHIL NFR BLD: 1 %
ERYTHROCYTE [DISTWIDTH] IN BLOOD: 14.2 % (ref 11–15)
FASTING DURATION TIME PATIENT: ABNORMAL H
GFR SERPLBLD BASED ON 1.73 SQ M-ARVRAT: 59 ML/MIN
GLOBULIN SER-MCNC: 3.3 G/DL (ref 2–4)
GLUCOSE BLDC GLUCOMTR-MCNC: 113 MG/DL (ref 70–99)
GLUCOSE BLDC GLUCOMTR-MCNC: 133 MG/DL (ref 70–99)
GLUCOSE BLDC GLUCOMTR-MCNC: 143 MG/DL (ref 70–99)
GLUCOSE BLDC GLUCOMTR-MCNC: 54 MG/DL (ref 70–99)
GLUCOSE BLDC GLUCOMTR-MCNC: 59 MG/DL (ref 70–99)
GLUCOSE BLDC GLUCOMTR-MCNC: 87 MG/DL (ref 70–99)
GLUCOSE SERPL-MCNC: 59 MG/DL (ref 70–99)
HBA1C MFR BLD: 8.2 % (ref 4.5–5.6)
HCT VFR BLD CALC: 47.3 % (ref 39–51)
HGB BLD-MCNC: 15.4 G/DL (ref 13–17)
IMM GRANULOCYTES # BLD AUTO: 0 K/MCL (ref 0–0.2)
IMM GRANULOCYTES # BLD: 1 %
LYMPHOCYTES # BLD: 1.5 K/MCL (ref 1–4)
LYMPHOCYTES NFR BLD: 25 %
MAGNESIUM SERPL-MCNC: 2.3 MG/DL (ref 1.7–2.4)
MCH RBC QN AUTO: 28.8 PG (ref 26–34)
MCHC RBC AUTO-ENTMCNC: 32.6 G/DL (ref 32–36.5)
MCV RBC AUTO: 88.4 FL (ref 78–100)
MONOCYTES # BLD: 0.7 K/MCL (ref 0.3–0.9)
MONOCYTES NFR BLD: 11 %
NEUTROPHILS # BLD: 3.5 K/MCL (ref 1.8–7.7)
NEUTROPHILS NFR BLD: 61 %
NRBC BLD MANUAL-RTO: 0 /100 WBC
PLATELET # BLD AUTO: 238 K/MCL (ref 140–450)
POTASSIUM SERPL-SCNC: 3.5 MMOL/L (ref 3.4–5.1)
PROT SERPL-MCNC: 6.8 G/DL (ref 6.4–8.2)
RBC # BLD: 5.35 MIL/MCL (ref 4.5–5.9)
SARS-COV-2 RNA RESP QL NAA+PROBE: NOT DETECTED
SERVICE CMNT-IMP: NORMAL
SERVICE CMNT-IMP: NORMAL
SODIUM SERPL-SCNC: 140 MMOL/L (ref 135–145)
TSH SERPL-ACNC: 1.44 MCUNITS/ML (ref 0.35–5)
WBC # BLD: 5.8 K/MCL (ref 4.2–11)

## 2022-08-29 PROCEDURE — 10004651 HB RX, NO CHARGE ITEM: Performed by: INTERNAL MEDICINE

## 2022-08-29 PROCEDURE — 83036 HEMOGLOBIN GLYCOSYLATED A1C: CPT | Performed by: NURSE PRACTITIONER

## 2022-08-29 PROCEDURE — 10002807 HB RX 258: Performed by: INTERNAL MEDICINE

## 2022-08-29 PROCEDURE — 10004651 HB RX, NO CHARGE ITEM: Performed by: EMERGENCY MEDICINE

## 2022-08-29 PROCEDURE — 36415 COLL VENOUS BLD VENIPUNCTURE: CPT | Performed by: INTERNAL MEDICINE

## 2022-08-29 PROCEDURE — 10002800 HB RX 250 W HCPCS: Performed by: NURSE PRACTITIONER

## 2022-08-29 PROCEDURE — 84443 ASSAY THYROID STIM HORMONE: CPT | Performed by: NURSE PRACTITIONER

## 2022-08-29 PROCEDURE — 80053 COMPREHEN METABOLIC PANEL: CPT | Performed by: INTERNAL MEDICINE

## 2022-08-29 PROCEDURE — 96365 THER/PROPH/DIAG IV INF INIT: CPT

## 2022-08-29 PROCEDURE — 10002800 HB RX 250 W HCPCS: Performed by: INTERNAL MEDICINE

## 2022-08-29 PROCEDURE — 10002803 HB RX 637: Performed by: INTERNAL MEDICINE

## 2022-08-29 PROCEDURE — 83735 ASSAY OF MAGNESIUM: CPT | Performed by: NURSE PRACTITIONER

## 2022-08-29 PROCEDURE — 82962 GLUCOSE BLOOD TEST: CPT

## 2022-08-29 PROCEDURE — 96372 THER/PROPH/DIAG INJ SC/IM: CPT

## 2022-08-29 PROCEDURE — 85025 COMPLETE CBC W/AUTO DIFF WBC: CPT | Performed by: INTERNAL MEDICINE

## 2022-08-29 PROCEDURE — 10000002 HB ROOM CHARGE MED SURG

## 2022-08-29 PROCEDURE — G0378 HOSPITAL OBSERVATION PER HR: HCPCS

## 2022-08-29 RX ORDER — ENOXAPARIN SODIUM 100 MG/ML
40 INJECTION SUBCUTANEOUS EVERY 24 HOURS
Status: DISCONTINUED | OUTPATIENT
Start: 2022-08-29 | End: 2022-08-31 | Stop reason: HOSPADM

## 2022-08-29 RX ORDER — INSULIN GLARGINE 100 [IU]/ML
16 INJECTION, SOLUTION SUBCUTANEOUS NIGHTLY
Status: DISCONTINUED | OUTPATIENT
Start: 2022-08-29 | End: 2022-08-30

## 2022-08-29 RX ADMIN — AMLODIPINE BESYLATE 10 MG: 10 TABLET ORAL at 08:30

## 2022-08-29 RX ADMIN — INSULIN LISPRO 6 UNITS: 100 INJECTION, SOLUTION INTRAVENOUS; SUBCUTANEOUS at 12:40

## 2022-08-29 RX ADMIN — ATORVASTATIN CALCIUM 20 MG: 20 TABLET, FILM COATED ORAL at 21:52

## 2022-08-29 RX ADMIN — CLOPIDOGREL BISULFATE 75 MG: 75 TABLET, FILM COATED ORAL at 08:30

## 2022-08-29 RX ADMIN — VANCOMYCIN HYDROCHLORIDE 1000 MG: 1 INJECTION, POWDER, LYOPHILIZED, FOR SOLUTION INTRAVENOUS at 09:00

## 2022-08-29 RX ADMIN — METOPROLOL SUCCINATE 25 MG: 25 TABLET, EXTENDED RELEASE ORAL at 08:30

## 2022-08-29 RX ADMIN — INSULIN GLARGINE 8 UNITS: 100 INJECTION, SOLUTION SUBCUTANEOUS at 23:19

## 2022-08-29 RX ADMIN — SODIUM CHLORIDE, PRESERVATIVE FREE 2 ML: 5 INJECTION INTRAVENOUS at 21:52

## 2022-08-29 RX ADMIN — HYDRALAZINE HYDROCHLORIDE 25 MG: 50 TABLET, FILM COATED ORAL at 08:30

## 2022-08-29 RX ADMIN — ENOXAPARIN SODIUM 40 MG: 40 INJECTION SUBCUTANEOUS at 08:30

## 2022-08-29 RX ADMIN — INSULIN LISPRO 6 UNITS: 100 INJECTION, SOLUTION INTRAVENOUS; SUBCUTANEOUS at 08:44

## 2022-08-29 RX ADMIN — ASPIRIN 81 MG: 81 TABLET, COATED ORAL at 08:30

## 2022-08-29 RX ADMIN — LISINOPRIL 20 MG: 20 TABLET ORAL at 08:30

## 2022-08-29 RX ADMIN — FAMOTIDINE 20 MG: 20 TABLET ORAL at 08:30

## 2022-08-29 ASSESSMENT — PAIN SCALES - GENERAL
PAINLEVEL_OUTOF10: 0
PAINLEVEL_OUTOF10: 0

## 2022-08-30 ENCOUNTER — CASE MANAGEMENT (OUTPATIENT)
Dept: CARE COORDINATION | Age: 72
End: 2022-08-30

## 2022-08-30 LAB
ALBUMIN SERPL-MCNC: 3.4 G/DL (ref 3.6–5.1)
ALBUMIN/GLOB SERPL: 0.9 {RATIO} (ref 1–2.4)
ALP SERPL-CCNC: 81 UNITS/L (ref 45–117)
ALT SERPL-CCNC: 23 UNITS/L
ANION GAP SERPL CALC-SCNC: 10 MMOL/L (ref 7–19)
AST SERPL-CCNC: 22 UNITS/L
BASOPHILS # BLD: 0 K/MCL (ref 0–0.3)
BASOPHILS NFR BLD: 1 %
BILIRUB SERPL-MCNC: 0.8 MG/DL (ref 0.2–1)
BUN SERPL-MCNC: 21 MG/DL (ref 6–20)
BUN/CREAT SERPL: 17 (ref 7–25)
CALCIUM SERPL-MCNC: 9.4 MG/DL (ref 8.4–10.2)
CHLORIDE SERPL-SCNC: 107 MMOL/L (ref 97–110)
CO2 SERPL-SCNC: 24 MMOL/L (ref 21–32)
CREAT SERPL-MCNC: 1.25 MG/DL (ref 0.67–1.17)
DEPRECATED RDW RBC: 45.3 FL (ref 39–50)
EOSINOPHIL # BLD: 0.1 K/MCL (ref 0–0.5)
EOSINOPHIL NFR BLD: 3 %
ERYTHROCYTE [DISTWIDTH] IN BLOOD: 14.2 % (ref 11–15)
FASTING DURATION TIME PATIENT: ABNORMAL H
GFR SERPLBLD BASED ON 1.73 SQ M-ARVRAT: 61 ML/MIN
GLOBULIN SER-MCNC: 3.6 G/DL (ref 2–4)
GLUCOSE BLDC GLUCOMTR-MCNC: 132 MG/DL (ref 70–99)
GLUCOSE BLDC GLUCOMTR-MCNC: 157 MG/DL (ref 70–99)
GLUCOSE BLDC GLUCOMTR-MCNC: 215 MG/DL (ref 70–99)
GLUCOSE BLDC GLUCOMTR-MCNC: 69 MG/DL (ref 70–99)
GLUCOSE BLDC GLUCOMTR-MCNC: 82 MG/DL (ref 70–99)
GLUCOSE SERPL-MCNC: 106 MG/DL (ref 70–99)
HCT VFR BLD CALC: 48.9 % (ref 39–51)
HGB BLD-MCNC: 16.2 G/DL (ref 13–17)
IMM GRANULOCYTES # BLD AUTO: 0 K/MCL (ref 0–0.2)
IMM GRANULOCYTES # BLD: 0 %
LYMPHOCYTES # BLD: 1.4 K/MCL (ref 1–4)
LYMPHOCYTES NFR BLD: 28 %
MAGNESIUM SERPL-MCNC: 2.3 MG/DL (ref 1.7–2.4)
MCH RBC QN AUTO: 29 PG (ref 26–34)
MCHC RBC AUTO-ENTMCNC: 33.1 G/DL (ref 32–36.5)
MCV RBC AUTO: 87.6 FL (ref 78–100)
MONOCYTES # BLD: 0.6 K/MCL (ref 0.3–0.9)
MONOCYTES NFR BLD: 12 %
NEUTROPHILS # BLD: 2.9 K/MCL (ref 1.8–7.7)
NEUTROPHILS NFR BLD: 56 %
NRBC BLD MANUAL-RTO: 0 /100 WBC
PLATELET # BLD AUTO: 232 K/MCL (ref 140–450)
POTASSIUM SERPL-SCNC: 4.2 MMOL/L (ref 3.4–5.1)
PROT SERPL-MCNC: 7 G/DL (ref 6.4–8.2)
RBC # BLD: 5.58 MIL/MCL (ref 4.5–5.9)
SODIUM SERPL-SCNC: 137 MMOL/L (ref 135–145)
WBC # BLD: 5.1 K/MCL (ref 4.2–11)

## 2022-08-30 PROCEDURE — 10002807 HB RX 258: Performed by: INTERNAL MEDICINE

## 2022-08-30 PROCEDURE — 10002800 HB RX 250 W HCPCS: Performed by: INTERNAL MEDICINE

## 2022-08-30 PROCEDURE — 10000002 HB ROOM CHARGE MED SURG

## 2022-08-30 PROCEDURE — 85025 COMPLETE CBC W/AUTO DIFF WBC: CPT | Performed by: NURSE PRACTITIONER

## 2022-08-30 PROCEDURE — 10004651 HB RX, NO CHARGE ITEM: Performed by: EMERGENCY MEDICINE

## 2022-08-30 PROCEDURE — 10004651 HB RX, NO CHARGE ITEM: Performed by: INTERNAL MEDICINE

## 2022-08-30 PROCEDURE — 80053 COMPREHEN METABOLIC PANEL: CPT | Performed by: NURSE PRACTITIONER

## 2022-08-30 PROCEDURE — 10002803 HB RX 637: Performed by: INTERNAL MEDICINE

## 2022-08-30 PROCEDURE — 83735 ASSAY OF MAGNESIUM: CPT | Performed by: NURSE PRACTITIONER

## 2022-08-30 PROCEDURE — 10002800 HB RX 250 W HCPCS: Performed by: NURSE PRACTITIONER

## 2022-08-30 PROCEDURE — 36415 COLL VENOUS BLD VENIPUNCTURE: CPT | Performed by: NURSE PRACTITIONER

## 2022-08-30 RX ORDER — INSULIN GLARGINE 100 [IU]/ML
10 INJECTION, SOLUTION SUBCUTANEOUS NIGHTLY
Status: DISCONTINUED | OUTPATIENT
Start: 2022-08-30 | End: 2022-08-31 | Stop reason: HOSPADM

## 2022-08-30 RX ADMIN — CLOPIDOGREL BISULFATE 75 MG: 75 TABLET, FILM COATED ORAL at 08:41

## 2022-08-30 RX ADMIN — INSULIN LISPRO 1 UNITS: 100 INJECTION, SOLUTION INTRAVENOUS; SUBCUTANEOUS at 18:00

## 2022-08-30 RX ADMIN — AMLODIPINE BESYLATE 10 MG: 10 TABLET ORAL at 08:41

## 2022-08-30 RX ADMIN — SODIUM CHLORIDE, PRESERVATIVE FREE 2 ML: 5 INJECTION INTRAVENOUS at 21:25

## 2022-08-30 RX ADMIN — LISINOPRIL 20 MG: 20 TABLET ORAL at 08:41

## 2022-08-30 RX ADMIN — ENOXAPARIN SODIUM 40 MG: 40 INJECTION SUBCUTANEOUS at 08:41

## 2022-08-30 RX ADMIN — ATORVASTATIN CALCIUM 20 MG: 20 TABLET, FILM COATED ORAL at 21:21

## 2022-08-30 RX ADMIN — VANCOMYCIN HYDROCHLORIDE 1000 MG: 1 INJECTION, POWDER, LYOPHILIZED, FOR SOLUTION INTRAVENOUS at 09:11

## 2022-08-30 RX ADMIN — METOPROLOL SUCCINATE 25 MG: 25 TABLET, EXTENDED RELEASE ORAL at 08:41

## 2022-08-30 RX ADMIN — HYDRALAZINE HYDROCHLORIDE 25 MG: 50 TABLET, FILM COATED ORAL at 08:41

## 2022-08-30 RX ADMIN — ASPIRIN 81 MG: 81 TABLET, COATED ORAL at 08:41

## 2022-08-30 RX ADMIN — FAMOTIDINE 20 MG: 20 TABLET ORAL at 08:41

## 2022-08-30 RX ADMIN — INSULIN GLARGINE 10 UNITS: 100 INJECTION, SOLUTION SUBCUTANEOUS at 21:23

## 2022-08-30 RX ADMIN — SODIUM CHLORIDE, PRESERVATIVE FREE 2 ML: 5 INJECTION INTRAVENOUS at 08:44

## 2022-08-30 ASSESSMENT — PAIN SCALES - GENERAL
PAINLEVEL_OUTOF10: 0
PAINLEVEL_OUTOF10: 0

## 2022-08-31 VITALS
BODY MASS INDEX: 26.64 KG/M2 | WEIGHT: 186.07 LBS | DIASTOLIC BLOOD PRESSURE: 95 MMHG | TEMPERATURE: 97.3 F | SYSTOLIC BLOOD PRESSURE: 154 MMHG | HEIGHT: 70 IN | HEART RATE: 73 BPM | RESPIRATION RATE: 16 BRPM | OXYGEN SATURATION: 97 %

## 2022-08-31 LAB
ALBUMIN SERPL-MCNC: 3.3 G/DL (ref 3.6–5.1)
ALBUMIN/GLOB SERPL: 1 {RATIO} (ref 1–2.4)
ALP SERPL-CCNC: 79 UNITS/L (ref 45–117)
ALT SERPL-CCNC: 22 UNITS/L
ANION GAP SERPL CALC-SCNC: 9 MMOL/L (ref 7–19)
AST SERPL-CCNC: 18 UNITS/L
BASOPHILS # BLD: 0 K/MCL (ref 0–0.3)
BASOPHILS NFR BLD: 1 %
BILIRUB SERPL-MCNC: 0.9 MG/DL (ref 0.2–1)
BUN SERPL-MCNC: 21 MG/DL (ref 6–20)
BUN/CREAT SERPL: 17 (ref 7–25)
CALCIUM SERPL-MCNC: 9.1 MG/DL (ref 8.4–10.2)
CHLORIDE SERPL-SCNC: 107 MMOL/L (ref 97–110)
CO2 SERPL-SCNC: 27 MMOL/L (ref 21–32)
CREAT SERPL-MCNC: 1.23 MG/DL (ref 0.67–1.17)
DEPRECATED RDW RBC: 44.8 FL (ref 39–50)
EOSINOPHIL # BLD: 0.1 K/MCL (ref 0–0.5)
EOSINOPHIL NFR BLD: 3 %
ERYTHROCYTE [DISTWIDTH] IN BLOOD: 14 % (ref 11–15)
FASTING DURATION TIME PATIENT: ABNORMAL H
GFR SERPLBLD BASED ON 1.73 SQ M-ARVRAT: 62 ML/MIN
GLOBULIN SER-MCNC: 3.4 G/DL (ref 2–4)
GLUCOSE BLDC GLUCOMTR-MCNC: 145 MG/DL (ref 70–99)
GLUCOSE BLDC GLUCOMTR-MCNC: 78 MG/DL (ref 70–99)
GLUCOSE SERPL-MCNC: 68 MG/DL (ref 70–99)
HCT VFR BLD CALC: 49 % (ref 39–51)
HGB BLD-MCNC: 16.5 G/DL (ref 13–17)
IMM GRANULOCYTES # BLD AUTO: 0 K/MCL (ref 0–0.2)
IMM GRANULOCYTES # BLD: 0 %
LYMPHOCYTES # BLD: 1.4 K/MCL (ref 1–4)
LYMPHOCYTES NFR BLD: 32 %
MAGNESIUM SERPL-MCNC: 2.2 MG/DL (ref 1.7–2.4)
MCH RBC QN AUTO: 29.3 PG (ref 26–34)
MCHC RBC AUTO-ENTMCNC: 33.7 G/DL (ref 32–36.5)
MCV RBC AUTO: 87 FL (ref 78–100)
MONOCYTES # BLD: 0.6 K/MCL (ref 0.3–0.9)
MONOCYTES NFR BLD: 13 %
NEUTROPHILS # BLD: 2.3 K/MCL (ref 1.8–7.7)
NEUTROPHILS NFR BLD: 51 %
NRBC BLD MANUAL-RTO: 0 /100 WBC
PLATELET # BLD AUTO: 220 K/MCL (ref 140–450)
POTASSIUM SERPL-SCNC: 3.8 MMOL/L (ref 3.4–5.1)
PROT SERPL-MCNC: 6.7 G/DL (ref 6.4–8.2)
RBC # BLD: 5.63 MIL/MCL (ref 4.5–5.9)
SODIUM SERPL-SCNC: 139 MMOL/L (ref 135–145)
VANCOMYCIN TROUGH SERPL-MCNC: 8.2 MCG/ML (ref 10–20)
WBC # BLD: 4.4 K/MCL (ref 4.2–11)

## 2022-08-31 PROCEDURE — 85025 COMPLETE CBC W/AUTO DIFF WBC: CPT | Performed by: NURSE PRACTITIONER

## 2022-08-31 PROCEDURE — 36415 COLL VENOUS BLD VENIPUNCTURE: CPT | Performed by: INTERNAL MEDICINE

## 2022-08-31 PROCEDURE — 80053 COMPREHEN METABOLIC PANEL: CPT | Performed by: NURSE PRACTITIONER

## 2022-08-31 PROCEDURE — 10004651 HB RX, NO CHARGE ITEM: Performed by: INTERNAL MEDICINE

## 2022-08-31 PROCEDURE — 10002800 HB RX 250 W HCPCS: Performed by: INTERNAL MEDICINE

## 2022-08-31 PROCEDURE — 10002807 HB RX 258: Performed by: INTERNAL MEDICINE

## 2022-08-31 PROCEDURE — 10002803 HB RX 637: Performed by: INTERNAL MEDICINE

## 2022-08-31 PROCEDURE — 80202 ASSAY OF VANCOMYCIN: CPT | Performed by: INTERNAL MEDICINE

## 2022-08-31 PROCEDURE — 83735 ASSAY OF MAGNESIUM: CPT | Performed by: NURSE PRACTITIONER

## 2022-08-31 PROCEDURE — 10002800 HB RX 250 W HCPCS: Performed by: NURSE PRACTITIONER

## 2022-08-31 PROCEDURE — 10004651 HB RX, NO CHARGE ITEM: Performed by: EMERGENCY MEDICINE

## 2022-08-31 PROCEDURE — 10002801 HB RX 250 W/O HCPCS: Performed by: INTERNAL MEDICINE

## 2022-08-31 RX ORDER — CEPHALEXIN 500 MG/1
500 CAPSULE ORAL 2 TIMES DAILY
Qty: 14 CAPSULE | Refills: 0 | Status: SHIPPED | OUTPATIENT
Start: 2022-08-31 | End: 2022-09-04

## 2022-08-31 RX ADMIN — AMLODIPINE BESYLATE 10 MG: 10 TABLET ORAL at 08:32

## 2022-08-31 RX ADMIN — FAMOTIDINE 20 MG: 20 TABLET ORAL at 08:32

## 2022-08-31 RX ADMIN — CLOPIDOGREL BISULFATE 75 MG: 75 TABLET, FILM COATED ORAL at 08:32

## 2022-08-31 RX ADMIN — ASPIRIN 81 MG: 81 TABLET, COATED ORAL at 08:32

## 2022-08-31 RX ADMIN — VANCOMYCIN HYDROCHLORIDE 1250 MG: 10 INJECTION, POWDER, LYOPHILIZED, FOR SOLUTION INTRAVENOUS at 12:18

## 2022-08-31 RX ADMIN — ENOXAPARIN SODIUM 40 MG: 40 INJECTION SUBCUTANEOUS at 08:34

## 2022-08-31 RX ADMIN — SODIUM CHLORIDE, PRESERVATIVE FREE 2 ML: 5 INJECTION INTRAVENOUS at 09:00

## 2022-08-31 RX ADMIN — METOPROLOL SUCCINATE 25 MG: 25 TABLET, EXTENDED RELEASE ORAL at 08:32

## 2022-08-31 RX ADMIN — HYDRALAZINE HYDROCHLORIDE 25 MG: 50 TABLET, FILM COATED ORAL at 08:32

## 2022-08-31 RX ADMIN — LISINOPRIL 20 MG: 20 TABLET ORAL at 08:32

## 2022-08-31 ASSESSMENT — PAIN SCALES - GENERAL: PAINLEVEL_OUTOF10: 0

## 2022-09-01 ENCOUNTER — CASE MANAGEMENT (OUTPATIENT)
Dept: CARE COORDINATION | Age: 72
End: 2022-09-01

## 2022-09-02 LAB
BACTERIA BLD CULT: NORMAL
BACTERIA BLD CULT: NORMAL

## 2022-11-29 ENCOUNTER — CASE MANAGEMENT (OUTPATIENT)
Dept: CARE COORDINATION | Age: 72
End: 2022-11-29

## 2023-06-21 NOTE — PROGRESS NOTES
[facility] Progress Note    Babar Grey Patient Status:  Inpatient    1950 MRN [id number]   [facility] 7NE-A Attending Aneta Ibarra MD   [facility] Day # 4 PCP Rosalinda Levine MD     Subjective:  [address]ale[address] i Is This A New Presentation, Or A Follow-Up?: Skin Lesion How Severe Is Your Skin Lesion?: moderate clear, EOM's intact, fundi     benign, both eyes, pt legally blind        Ears:    Normal TM's and external ear canals, both ears   Nose:   Nares normal, septum midline, mucosa normal, no drainage    or sinus tenderness   Throat:   Lips, mucosa, and tongue 06/25/20  1701 06/25/20  2208   PGLU 197* 109* 144* 202* 226*         Radiology:  Mri Foot, Right (cpt=73718)    Result Date: 6/23/2020  PROCEDURE:  MRI FOOT, RIGHT (CPT=73718)  COMPARISON:  None.   INDICATIONS:  chronic dorsal DFU, hx of abscess s/p I&D, r involving the distal right posterior tibial tendon.    Dictated by: Migue Mccurdy MD on 6/23/2020 at 8:14 AM     Finalized by: Migue Mccurdy MD on 6/23/2020 at 8:34 AM           Ankle Toe Brachial Index Bilateral (cpt=93922)    Result Date: 6/23 with hypertriglyceridemia  LDL goal below 70      Blindness, legal  CPM      PAD (peripheral artery disease) ([facility] [address]  S. P.balloon angioplasty Rt Peroneal,Rt Post Tibial and Rt Ant Tibial , Laser atherectomy Rt Distal anterior tibial  On Plavix, Art Doppler O

## 2023-07-23 LAB — HBA1C MFR BLD: 8.2 %

## 2023-07-25 DIAGNOSIS — N13.8 BENIGN PROSTATIC HYPERPLASIA WITH URINARY OBSTRUCTION: Primary | ICD-10-CM

## 2023-07-25 DIAGNOSIS — N40.1 BENIGN PROSTATIC HYPERPLASIA WITH URINARY OBSTRUCTION: Primary | ICD-10-CM

## 2023-08-28 ENCOUNTER — HOSPITAL ENCOUNTER (OUTPATIENT)
Dept: ULTRASOUND IMAGING | Age: 73
Discharge: HOME | End: 2023-08-28
Attending: UROLOGY

## 2023-08-28 DIAGNOSIS — N40.1 BENIGN PROSTATIC HYPERPLASIA WITH URINARY OBSTRUCTION: ICD-10-CM

## 2023-08-28 DIAGNOSIS — N13.8 BENIGN PROSTATIC HYPERPLASIA WITH URINARY OBSTRUCTION: ICD-10-CM

## 2023-08-28 PROCEDURE — 76775 US EXAM ABDO BACK WALL LIM: CPT

## 2023-09-12 DIAGNOSIS — N28.89 RENAL MASS: Primary | ICD-10-CM

## 2023-09-26 ENCOUNTER — HOSPITAL ENCOUNTER (OUTPATIENT)
Dept: LAB | Age: 73
Discharge: HOME | End: 2023-09-26

## 2023-09-26 DIAGNOSIS — N28.89 OTHER SPECIFIED DISORDERS OF KIDNEY AND URETER: Primary | ICD-10-CM

## 2023-09-26 DIAGNOSIS — N28.89 OTHER SPECIFIED DISORDERS OF KIDNEY AND URETER: ICD-10-CM

## 2023-09-26 PROCEDURE — 80048 BASIC METABOLIC PNL TOTAL CA: CPT | Performed by: UROLOGY

## 2023-09-26 PROCEDURE — 36415 COLL VENOUS BLD VENIPUNCTURE: CPT | Performed by: UROLOGY

## 2023-09-27 LAB
ANION GAP SERPL CALC-SCNC: 11 MMOL/L (ref 7–19)
BUN SERPL-MCNC: 23 MG/DL (ref 6–20)
BUN/CREAT SERPL: 16 (ref 7–25)
CALCIUM SERPL-MCNC: 9.7 MG/DL (ref 8.4–10.2)
CHLORIDE SERPL-SCNC: 106 MMOL/L (ref 97–110)
CO2 SERPL-SCNC: 27 MMOL/L (ref 21–32)
CREAT SERPL-MCNC: 1.48 MG/DL (ref 0.67–1.17)
EGFRCR SERPLBLD CKD-EPI 2021: 49 ML/MIN/{1.73_M2}
FASTING DURATION TIME PATIENT: 0 HOURS (ref 0–999)
GLUCOSE SERPL-MCNC: 122 MG/DL (ref 70–99)
POTASSIUM SERPL-SCNC: 4.4 MMOL/L (ref 3.4–5.1)
SODIUM SERPL-SCNC: 140 MMOL/L (ref 135–145)

## 2023-09-29 ENCOUNTER — HOSPITAL ENCOUNTER (OUTPATIENT)
Dept: CT IMAGING | Age: 73
Discharge: HOME | End: 2023-09-29
Attending: UROLOGY

## 2023-09-29 DIAGNOSIS — N28.89 RENAL MASS: ICD-10-CM

## 2023-09-29 PROCEDURE — 74170 CT ABD WO CNTRST FLWD CNTRST: CPT

## 2023-09-29 PROCEDURE — G1004 CDSM NDSC: HCPCS

## 2023-09-29 PROCEDURE — 10002805 HB CONTRAST AGENT: Performed by: UROLOGY

## 2023-09-29 RX ADMIN — IOHEXOL 80 ML: 350 INJECTION, SOLUTION INTRAVENOUS at 13:54

## 2023-10-19 ENCOUNTER — CLINICAL ABSTRACT (OUTPATIENT)
Dept: CARE COORDINATION | Age: 73
End: 2023-10-19

## 2023-11-20 DIAGNOSIS — K86.2 PANCREATIC CYST: Primary | ICD-10-CM

## 2023-11-25 ENCOUNTER — HOSPITAL ENCOUNTER (OUTPATIENT)
Dept: MRI IMAGING | Age: 73
Discharge: HOME | End: 2023-11-25
Attending: PHYSICIAN ASSISTANT

## 2023-11-25 DIAGNOSIS — K86.2 PANCREATIC CYST: ICD-10-CM

## 2023-11-25 PROCEDURE — 10002805 HB CONTRAST AGENT: Performed by: PHYSICIAN ASSISTANT

## 2023-11-25 PROCEDURE — A9585 GADOBUTROL INJECTION: HCPCS | Performed by: PHYSICIAN ASSISTANT

## 2023-11-25 PROCEDURE — 74183 MRI ABD W/O CNTR FLWD CNTR: CPT

## 2023-11-25 RX ORDER — GADOBUTROL 604.72 MG/ML
7.5 INJECTION INTRAVENOUS ONCE
Status: COMPLETED | OUTPATIENT
Start: 2023-11-25 | End: 2023-11-25

## 2023-11-25 RX ADMIN — GADOBUTROL 7.5 ML: 604.72 INJECTION INTRAVENOUS at 08:47

## 2024-06-01 PROBLEM — L02.91 ABSCESS: Status: ACTIVE | Noted: 2022-08-28

## 2024-06-01 PROBLEM — M79.642 LEFT HAND PAIN: Status: ACTIVE | Noted: 2024-06-01

## 2024-06-18 ENCOUNTER — TELEPHONE (OUTPATIENT)
Dept: OTHER | Age: 74
End: 2024-06-18

## 2024-06-23 PROBLEM — M79.89 HAND SWELLING: Status: ACTIVE | Noted: 2024-06-23

## 2024-07-15 DIAGNOSIS — N28.89 RENAL MASS: Primary | ICD-10-CM

## 2024-07-24 ENCOUNTER — HOSPITAL ENCOUNTER (OUTPATIENT)
Dept: ULTRASOUND IMAGING | Age: 74
Discharge: HOME | End: 2024-07-24
Attending: UROLOGY

## 2024-07-24 ENCOUNTER — HOSPITAL ENCOUNTER (OUTPATIENT)
Dept: LAB | Age: 74
Discharge: HOME | End: 2024-07-24
Attending: UROLOGY

## 2024-07-24 DIAGNOSIS — N28.89 RENAL MASS: ICD-10-CM

## 2024-07-24 DIAGNOSIS — N40.1 BENIGN PROSTATIC HYPERPLASIA WITH LOWER URINARY TRACT SYMPTOMS: ICD-10-CM

## 2024-07-24 DIAGNOSIS — N28.89 OTHER SPECIFIED DISORDERS OF KIDNEY AND URETER: ICD-10-CM

## 2024-07-24 DIAGNOSIS — N40.1 BENIGN PROSTATIC HYPERPLASIA WITH LOWER URINARY TRACT SYMPTOMS: Primary | ICD-10-CM

## 2024-07-24 PROCEDURE — 76775 US EXAM ABDO BACK WALL LIM: CPT

## 2024-07-25 LAB — PSA SERPL-MCNC: 0.42 NG/ML

## 2024-07-26 PROBLEM — E78.5 HYPERLIPIDEMIA: Status: RESOLVED | Noted: 2020-05-08 | Resolved: 2024-07-26

## 2024-07-26 PROBLEM — I25.10 CORONARY ATHEROSCLEROSIS: Status: ACTIVE | Noted: 2024-07-26

## 2024-07-26 PROBLEM — Z00.00 MEDICARE ANNUAL WELLNESS VISIT, SUBSEQUENT: Status: ACTIVE | Noted: 2024-07-26

## 2024-07-26 PROBLEM — H54.3 BLINDNESS OF BOTH EYES: Status: ACTIVE | Noted: 2024-07-26

## 2024-07-26 PROBLEM — E78.5 DYSLIPIDEMIA: Status: RESOLVED | Noted: 2022-08-29 | Resolved: 2024-07-26

## 2024-10-28 PROBLEM — E78.2 MIXED HYPERLIPIDEMIA: Status: ACTIVE | Noted: 2020-05-08

## 2024-10-28 PROBLEM — N40.1 BENIGN PROSTATIC HYPERPLASIA WITH LOWER URINARY TRACT SYMPTOMS: Status: ACTIVE | Noted: 2024-10-28

## 2024-10-28 PROBLEM — Z00.00 MEDICARE ANNUAL WELLNESS VISIT, SUBSEQUENT: Status: RESOLVED | Noted: 2024-07-26 | Resolved: 2024-10-28

## 2024-10-28 PROBLEM — L02.91 ABSCESS: Status: RESOLVED | Noted: 2022-08-28 | Resolved: 2024-10-28

## 2025-01-15 DIAGNOSIS — N28.89 RENAL MASS: Primary | ICD-10-CM

## 2025-01-27 PROBLEM — N18.31 STAGE 3A CHRONIC KIDNEY DISEASE  (CMD): Status: ACTIVE | Noted: 2025-01-27

## 2025-01-28 ENCOUNTER — HOSPITAL ENCOUNTER (OUTPATIENT)
Dept: ULTRASOUND IMAGING | Age: 75
Discharge: HOME | End: 2025-01-28
Attending: UROLOGY

## 2025-01-28 DIAGNOSIS — N28.89 RENAL MASS: ICD-10-CM

## 2025-01-28 PROCEDURE — 76775 US EXAM ABDO BACK WALL LIM: CPT

## 2025-08-11 ENCOUNTER — TELEPHONE (OUTPATIENT)
Dept: ORTHOPEDICS CLINIC | Facility: CLINIC | Age: 75
End: 2025-08-11

## 2025-08-12 ENCOUNTER — TELEPHONE (OUTPATIENT)
Dept: ORTHOPEDICS CLINIC | Facility: CLINIC | Age: 75
End: 2025-08-12

## 2025-08-12 DIAGNOSIS — T14.90XA INJURY: Primary | ICD-10-CM

## 2025-08-13 ENCOUNTER — HOSPITAL ENCOUNTER (OUTPATIENT)
Dept: GENERAL RADIOLOGY | Age: 75
Discharge: HOME | End: 2025-08-13
Attending: ORTHOPAEDIC SURGERY

## 2025-08-13 ENCOUNTER — OFFICE VISIT (OUTPATIENT)
Dept: ORTHOPEDICS CLINIC | Facility: CLINIC | Age: 75
End: 2025-08-13

## 2025-08-13 VITALS — BODY MASS INDEX: 28.06 KG/M2 | HEIGHT: 70 IN | WEIGHT: 196 LBS

## 2025-08-13 DIAGNOSIS — S46.001A INJURY OF RIGHT ROTATOR CUFF, INITIAL ENCOUNTER: Primary | ICD-10-CM

## 2025-08-13 DIAGNOSIS — T14.90XA INJURY: ICD-10-CM

## 2025-08-13 DIAGNOSIS — S93.491A SPRAIN OF ANTERIOR TALOFIBULAR LIGAMENT OF RIGHT ANKLE, INITIAL ENCOUNTER: ICD-10-CM

## 2025-08-13 PROCEDURE — 23500 CLTX CLAVICULAR FX W/O MNPJ: CPT | Performed by: ORTHOPAEDIC SURGERY

## 2025-08-13 PROCEDURE — 73610 X-RAY EXAM OF ANKLE: CPT | Performed by: ORTHOPAEDIC SURGERY

## 2025-08-13 PROCEDURE — 73000 X-RAY EXAM OF COLLAR BONE: CPT | Performed by: ORTHOPAEDIC SURGERY

## 2025-08-13 PROCEDURE — 99204 OFFICE O/P NEW MOD 45 MIN: CPT | Performed by: ORTHOPAEDIC SURGERY

## 2025-08-27 ENCOUNTER — OFFICE VISIT (OUTPATIENT)
Dept: ORTHOPEDICS CLINIC | Facility: CLINIC | Age: 75
End: 2025-08-27

## 2025-08-27 DIAGNOSIS — S42.034A CLOSED NONDISPLACED FRACTURE OF ACROMIAL END OF RIGHT CLAVICLE, INITIAL ENCOUNTER: Primary | ICD-10-CM

## 2025-08-27 DIAGNOSIS — M75.81 TENDINITIS OF RIGHT ROTATOR CUFF: ICD-10-CM

## 2025-08-27 PROCEDURE — 99214 OFFICE O/P EST MOD 30 MIN: CPT | Performed by: ORTHOPAEDIC SURGERY

## 2025-08-27 PROCEDURE — 23500 CLTX CLAVICULAR FX W/O MNPJ: CPT | Performed by: ORTHOPAEDIC SURGERY

## (undated) DEVICE — CHLORAPREP 26ML APPLICATOR

## (undated) DEVICE — FAN SPRAY KIT: Brand: PULSAVAC®

## (undated) DEVICE — UNDYED BRAIDED (POLYGLACTIN 910), SYNTHETIC ABSORBABLE SUTURE: Brand: COATED VICRYL

## (undated) DEVICE — GAUZE SPONGES,12 PLY: Brand: CURITY

## (undated) DEVICE — NON-ADHERENT STRIPS,OIL EMULSION: Brand: CURITY

## (undated) DEVICE — SPECIMEN CONTAINER,POSITIVE SEAL INDICATOR, OR PACKAGED: Brand: PRECISION

## (undated) DEVICE — SUTURE PROLENE 2-0 CT-2

## (undated) DEVICE — SUTURE PDS II 4-0 PS-2

## (undated) DEVICE — SOL  .9 1000ML BTL

## (undated) DEVICE — PREMIUM WET SKIN PREP TRAY: Brand: MEDLINE INDUSTRIES, INC.

## (undated) DEVICE — SOL  .9 3000ML

## (undated) DEVICE — STERILE SYNTHETIC POLYISOPRENE POWDER-FREE SURGICAL GLOVES WITH HYDROGEL COATING: Brand: PROTEXIS

## (undated) DEVICE — GAUZE,PACKING STRIP,IODOFORM,1/2"X5YD,ST: Brand: CURAD

## (undated) DEVICE — KENDALL SCD EXPRESS SLEEVES, KNEE LENGTH, MEDIUM: Brand: KENDALL SCD

## (undated) DEVICE — ABDOMINAL PAD: Brand: DERMACEA

## (undated) DEVICE — LOWER EXTREMITY CDS-LF: Brand: MEDLINE INDUSTRIES, INC.

## (undated) DEVICE — PROXIMATE SKIN STAPLERS (35 WIDE) CONTAINS 35 STAINLESS STEEL STAPLES (FIXED HEAD): Brand: PROXIMATE

## (undated) DEVICE — SUTURE MONOCRYL 4-0 PS-2

## (undated) DEVICE — SOL  .9 1000ML BAG

## (undated) DEVICE — SYRINGE 30ML LL TIP

## (undated) NOTE — LETTER
Sarah Oneal [address] [address]  [facility], [address] [facility]    Consent for Operation  Date: __________________                                Time: _______________    1.  I authorize the performance upon Manpreet Schafer the following operation:  Procedure procedure has been videotaped, the surgeon will obtain the original videotape. The hospital will not be responsible for storage or maintenance of this tape.     6. For the purpose of advancing medical education, I consent to the admittance of observers to t STATEMENTS REQUIRING INSERTION OR COMPLETION WERE FILLED IN.     Signature of Patient:   ___________________________    When the patient is a minor or mentally incompetent to give consent:  Signature of person authorized to consent for patient: ____________

## (undated) NOTE — LETTER
Sarah Oneal [address] [facility] [address], [address] [place] [address]    Consent for Operation  Date: 6/23/2020                                Time: _______________    1.  I authorize the performance upon Leighton Cerna the following operation:  Angiogram of the r videotape. [facility] will not be responsible for storage or maintenance of this tape. 6. For the purpose of advancing medical education, I consent to the admittance of observers to the Operating Room.   7. I authorize the use of any specimen, organs, ti When the patient is a minor or mentally incompetent to give consent:  Signature of person authorized to consent for patient: ___________________________   Relationship to patient: ____________________________________________________    Signature of Witness these medicines may increase my risk of anesthetic complications. iv. If I am allergic to anything or have had a reaction to anesthesia before. 3. I understand how the anesthesia medicine will help me (benefits).   4. I understand that with any type of an patient’s representative) and answered their questions. The patient or their representative has agreed to have anesthesia services.     _____________________________________________________________________________  Witness        Date   Time  I have faisal

## (undated) NOTE — LETTER
[facility] [address], [address]  Consent for Procedure/Sedation    Date: 6/23/2020    Time: 1535      1.  I authorize the performance upon Noa Dozier the following: Peripheral angiography, atherectomy, percutaneous translumi you may develop a skin reaction.       Signature of Patient: _______________________________________________________    Signature of person authorized                                           Relationship to  to consent for patient: _______________________

## (undated) NOTE — LETTER
Sarah Oneal 182 6 [address]  Greg, [address] [place] [address]    Consent for Operation  Date: __________________                                Time: _______________    1.  I authorize the performance upon Alf Lee the following operation:  Procedure procedure has been videotaped, the surgeon will obtain the original videotape. The hospital will not be responsible for storage or maintenance of this tape.   7. For the purpose of advancing medical education, I consent to the admittance of observers to the STATEMENTS REQUIRING INSERTION OR COMPLETION WERE FILLED IN.     Signature of Patient:   ___________________________    When the patient is a minor or mentally incompetent to give consent:  Signature of person authorized to consent for patient: ____________ supplements, and pills I can buy without a prescription (including street drugs/illegal medications). Failure to inform my anesthesiologist about these medicines may increase my risk of anesthetic complications. iv.  If I am allergic to anything or have ha Anesthesiologist Signature     Date   Time  I have discussed the procedure and information above with the patient (or patient’s representative) and answered their questions. The patient or their representative has agreed to have anesthesia services.     ___

## (undated) NOTE — LETTER
Sarah Oneal 182 6 [address]  Greg, [address] [place] [address]    Consent for Operation  Date: __________________                                Time: _______________    1.  I authorize the performance upon Natalia Rucker the following operation:  Procedure procedure has been videotaped, the surgeon will obtain the original videotape. The hospital will not be responsible for storage or maintenance of this tape.   7. For the purpose of advancing medical education, I consent to the admittance of observers to the STATEMENTS REQUIRING INSERTION OR COMPLETION WERE FILLED IN.     Signature of Patient:   ___________________________    When the patient is a minor or mentally incompetent to give consent:  Signature of person authorized to consent for patient: ____________ supplements, and pills I can buy without a prescription (including street drugs/illegal medications). Failure to inform my anesthesiologist about these medicines may increase my risk of anesthetic complications. iv.  If I am allergic to anything or have ha Anesthesiologist Signature     Date   Time  I have discussed the procedure and information above with the patient (or patient’s representative) and answered their questions. The patient or their representative has agreed to have anesthesia services.     ___

## (undated) NOTE — LETTER
Sarah Oneal 182 6 [address]  [place], [address] [facility]    Consent for Operation  Date: __________________                                Time: _______________    1.  I authorize the performance upon Marylen Oris the following operation:  Procedure procedure has been videotaped, the surgeon will obtain the original videotape. The hospital will not be responsible for storage or maintenance of this tape.   7. For the purpose of advancing medical education, I consent to the admittance of observers to the STATEMENTS REQUIRING INSERTION OR COMPLETION WERE FILLED IN.     Signature of Patient:   ___________________________    When the patient is a minor or mentally incompetent to give consent:  Signature of person authorized to consent for patient: ____________ supplements, and pills I can buy without a prescription (including street drugs/illegal medications). Failure to inform my anesthesiologist about these medicines may increase my risk of anesthetic complications. iv.  If I am allergic to anything or have ha Anesthesiologist Signature     Date   Time  I have discussed the procedure and information above with the patient (or patient’s representative) and answered their questions. The patient or their representative has agreed to have anesthesia services.     ___